# Patient Record
Sex: MALE | Race: WHITE
[De-identification: names, ages, dates, MRNs, and addresses within clinical notes are randomized per-mention and may not be internally consistent; named-entity substitution may affect disease eponyms.]

---

## 2019-08-19 NOTE — PDOC.FPRHP
- History of Present Illness


Chief Complaint: abdominal pain


History of Present Illness: 





Patient is 28M with PMHx of DM1, hypertrigliceridemia, and multiple admissions 

for pancreatitis presenting as a transfer from the Med for intractable pain/

splenomegaly. 





Patient reports that the pain began on  as a sharp stabbing LUQ pain 

accompanied by the feeling of abdominal fullness. He reports that he has also 

been having nausea, but denies vomiting. He states he has also been having 

diarrhea for the past 3-4 days accompanied with blood on the toilet paper when 

he wipes. He also reports of SOB and some vision changes today s/p opiate 

administration at the [facility]. 


Patient denies recent travel. Denies being sick recently. 


He was seen last week for left arm cellulitis and prescribed a 7-day course of 

clindamycin, given a prednisone shot, and prescribed a 5 day course of PO 

prednisone, which he has not taken since he has been hospitalized. 





- Allergies/Adverse Reactions


 Allergies











Allergy/AdvReac Type Severity Reaction Status Date / Time


 


No Known Allergies Allergy   Unverified 19 17:51














- Home Medications


 











 Medication  Instructions  Recorded  Confirmed  Type


 


Insulin Aspart [Novolog] 60 unit SQ AC 19 History














- History


PMHx:DMI, Hypertrigliceridemia, multiple hospitalizations for pancreatitis


 


PSHx: PTCA 2019, no CAD





FHx: 


mom: SLE, Brain cancer  at age 44


Dad: healthy


 


Social: no etoh for last 6 years, no smoking, no drugs


 








- Review of Systems


General: denies: fever/chills, weight/appetite/sleep changes


Eyes: reports: vision changes.  denies: eye pain


ENT: denies: nasal congestion, rhinorrhea


Respiratory: reports: shortness of breath.  denies: cough


Cardiovascular: denies: chest pain, edema


Gastrointestinal: reports: nausea, diarrhea, abdominal pain.  denies: vomiting


Genitourinary: denies: incontinence, dysuria


Skin: reports: rashes (LUE cellulitis)


Musculoskeletal: denies: pain, tenderness


Neurological: denies: numbness, weakness


Psychological: denies: anxiety, depression





- Vital signs


BP: [146/82]  HR: [85] RR: [20] Tmax: [99F] Pox: [91]% on [RA]  Wt: [131.5kg]   








- Physical Exam


Constitutional: awake, alert and oriented, well developed


HEENT: normocephalic and atraumatic, EOMI


Neck: supple, trachea midline


Chest: no-tender to palpation, no lesions


Heart: RRR, normal S1/S2


Lungs: no respiratory distress, good air movement


Abdomen: soft, bowel sounds present, other (RUQ tenderness to palpation)


Musculoskeletal: normal structure, normal tone


Neurological: no focal deficit, normal sensation


Skin: no rash/lesions (erythematous rash on Left forearm), good turgor, 

capillary refill <2 seconds


Heme/Lymphatic: no unusual bruising or bleeding, no purpura


Psychiatric: normal mood and affect, good judgment and insight





FMR H&P: Results





- Labs


Result Diagrams: 


 19 07:46





 19 04:14





- Radiology Interpretation


  ** CT scan - abdomen


Status: report reviewed by me (spleen [id number], hepatic steatosis, mild 

hepatomegaly)





  ** US - abdomen


Status: report reviewed by me (splenomegaly, fatty liver)





FMR H&P: A/P





- Problem List


(1) Splenomegaly


Current Visit: Yes   Status: Acute   Code(s): R16.1 - SPLENOMEGALY, NOT 

ELSEWHERE CLASSIFIED   





(2) Hypertriglyceridemia


Current Visit: Yes   Status: Acute   Code(s): E78.1 - PURE HYPERGLYCERIDEMIA   





(3) Diabetes type 1, uncontrolled


Current Visit: Yes   Status: Acute   Code(s): E10.65 - TYPE 1 DIABETES MELLITUS 

WITH HYPERGLYCEMIA   





- Plan





28M presented as transfer from the [facility] for splenomegaly and intractable 

abdominal pain





#Splenomegaly/intractable abdominal pain


-patient has had diarrhea the last 3-4 days associated with timeline of pain 

onset


-campy stool studies


-hep a, b, c studies


-hiv 


-cdiff


-fobt


-tylenol/tramadol for pain management


-consult heme/onc in the am, appreciate recs








#Hypertrigliceridemia


-was reportedly d/c from last hospitalization on fenofibrate, but patient has 

no recollection of this


-has multiple past hospitalizations for pancreatitis


-PTCA 2019 shows no CAD


-begin fenofibrate this hospitalization and encourage continuation outpatient


-begin statin this hospitalization





#DMII


-patient reportedly on 60u novolog pre-prandial


-recent A1C 9.5


-putting patient on 70/30 regimen


-aggressive sliding scale





#LUE cellulitis


-patient was on clindamycin and prednisone, but hasn't been on them since 

hospitalized


-starte patient on doxycycline





DVT proph: lovenox


GI proph: pepcid


Diet: CC





Dispo: inpatient for splenomegaly workup and pain management


Code: Full





FMR H&P: Upper Level





- Plan


Date/Time: 19








IDarrian MD, have evaluated this patient and agree with findings/plan 

as outlined by intern resident. Pertinent changes/additions are listed here.





Devaughn Flores is a 28 year old M with a PMH of type I DM per pt, 

hypertriglyceridemia, and multiple admission in past for pancreatitis who was 

transferred from the [facility] to [facility] for intractible abdominal pain and 

splenomegaly.  Patient states that he has had nausea, vomiting, diarrhea and 

left sided abdominal pain for about 2 days but it became constant and 10/10 

morning of 19.  Describes pain as sharp with no radiation. Denies fever, 

chills, chest pain, dypsnea, GI bleeding.  He went to [facility] where he was 

admitted.  Labs there were significant for lipase of 93, triglyceride of 1161, 

hemoglobin a1c of 9.5, neg lactic acid, normal AST/ALT.  CT abd at [facility] showed 

splenomegaly with no evidence of infarction and mild hepatic steatosis. At 

outside hospital, he was given multiple rounds of dilaudid for his abdominal 

pain.  Patient states it improved his pain for the most part.  Patient denies 

any sore throat, swollen lymph nodes, fevers, recent illnesses.  Patient is 

being placed on obs/medical for intractible abdominal pain and splenomegaly.  

Will consult heme/onc in the AM.  Continue pain control.  Checking stool studies

, HIV, Hepatitis panel, PT/INR, FOBT.  Anticipate hospital stay < 48 hours.  

Please see intern note above for full H&P, which I have reviewed and agree 

with.  





Addendum - Attending





- Attending Attestation


Date/Time: 19 1005





I personally evaluated the patient and discussed the management with Dr. Moreira 

and Emanuel on .


I agree with the History, Examination, Assessment and Plan documented above 

with any addition or exceptions noted below.





No opiate rx [place].  Await oncology consultation.  Hep/HIV screens.  

Consider workup as an outpatient if pain improved in AM.

## 2019-08-20 NOTE — PDOC.FM
- Subjective


Subjective: 





Pt states he has pain not controlled with tramadol. He has no other complaints.





- Objective


Vital Signs & Weight: 


 Vital Signs (12 hours)











  Temp Pulse Resp BP Pulse Ox


 


 08/20/19 08:00  98.1 F  67  20  140/83  95


 


 08/20/19 04:15  98.2 F  74  18  154/73 H  91 L


 


 08/20/19 00:05  98.2 F  96  18  139/80  93 L








 Weight











Weight                         131.542 kg














Result Diagrams: 


 08/20/19 07:46





 08/20/19 04:14





Phys Exam





- Physical Examination


Constitutional: NAD


HEENT: PERRLA, sclera anicteric


Neck: no nodes, no JVD


Respiratory: no wheezing, no rhonchi, clear to auscultation bilateral


Cardiovascular: RRR, no significant murmur


Gastrointestinal: soft, no distention, positive bowel sounds


tender to palpation in LUQ on medium palpation, no signs of peritonitis


Neurological: non-focal, moves all 4 limbs


Psychiatric: normal affect, A&O x 3





Dx/Plan


(1) LUQ abdominal pain


Code(s): R10.12 - LEFT UPPER QUADRANT PAIN   Status: Acute   





(2) Diabetes type 1, uncontrolled


Code(s): E10.65 - TYPE 1 DIABETES MELLITUS WITH HYPERGLYCEMIA   Status: Acute   





(3) Hypertriglyceridemia


Code(s): E78.1 - PURE HYPERGLYCERIDEMIA   Status: Acute   





(4) Splenomegaly


Code(s): R16.1 - SPLENOMEGALY, NOT ELSEWHERE CLASSIFIED   Status: Acute   





- Plan


Plan: 


28M presented as transfer from the [facility] for splenomegaly and intractable 

abdominal pain





#Splenomegaly


# LUQ Pain


Pain he states in not well controlled with tramadol, did not receive ibuprofen.

  Pain has been present for 3 days.  Pain is not intractable, he was able to 

perform daily activities such as shower today.  CT Abdomen revealed 

splenomegaly.  RUQ u/s revealed mild hepatic steatosis. Patient has had 

diarrhea the last 3-4 days associated with timeline of pain onset. Hep C, HIV 

negative.  Will continue to look for infectious etiology, consult surg, consult 

heme/onc.  Pt has leukopenia, thrombocytopenia.


-campy stool studies


-hiv 


-cdiff


-tylenol/tramadol for pain management


-consult heme/onc, appreciate recs


- consult surg, appreciate recs





#Hypertrigliceridemia


-was reportedly d/c from last hospitalization on fenofibrate, but patient has 

no recollection of this


-has multiple past hospitalizations for pancreatitis


-PTCA july 2019 shows no CAD


-begin statin this hospitalization, hold off on fenifibrate at this time 

potentially start outpt





#DMII 


-patient reportedly on 60u novolog pre-prandial


-recent A1C 9.5


-putting patient on 70/30 regimen


-aggressive sliding scale





#LUE cellulitis


-patient was on clindamycin and prednisone, but hasn't been on them since 

hospitalized


-started patient on doxycycline, will re-assess need for abx





DVT proph: lovenox


GI proph: pepcid


Diet: CC





Dispo: inpatient for splenomegaly workup and pain management


Code: Full








Addendum - Attending





- Attending Attestation


Date/Time: 08/20/19 7826





I personally evaluated the patient and discussed the management with Dr. Olvera.  


I agree with the History, Examination, Assessment and Plan documented above 

with any addition or exceptions noted below.


He has splenic tendernes on exam, but no rebound.  Pain is not intractable.  He 

is able to ambulate, toilet, shower and take po without difficulty.

## 2019-08-21 NOTE — PDOC.FM
- Subjective


Subjective: 





Pt is doing well. Pain is well controlled.  He did not need tylenol through 

evening.





- Objective


Vital Signs & Weight: 


 Vital Signs (12 hours)











  Temp Pulse Resp BP Pulse Ox


 


 08/20/19 19:45  98.7 F  68  12  156/97 H  97








 Weight











Admit Weight                   131.542 kg


 


Weight                         131.542 kg














I&O: 


 











 08/19/19 08/20/19 08/21/19





 06:59 06:59 06:59


 


Intake Total   1150


 


Balance   1150











Result Diagrams: 


 08/21/19 04:00





 08/21/19 04:00





Phys Exam





- Physical Examination


Constitutional: NAD


Respiratory: no wheezing, clear to auscultation bilateral


Cardiovascular: RRR, no significant murmur


Gastrointestinal: soft, no distention, positive bowel sounds


mild tenderness in LUQ, pressure felt in all other quadrants 





Dx/Plan


(1) LUQ abdominal pain


Code(s): R10.12 - LEFT UPPER QUADRANT PAIN   Status: Acute   





(2) Diabetes type 1, uncontrolled


Code(s): E10.65 - TYPE 1 DIABETES MELLITUS WITH HYPERGLYCEMIA   Status: Acute   





(3) Hypertriglyceridemia


Code(s): E78.1 - PURE HYPERGLYCERIDEMIA   Status: Acute   





(4) Splenomegaly


Code(s): R16.1 - SPLENOMEGALY, NOT ELSEWHERE CLASSIFIED   Status: Acute   





- Plan


Plan: 


28M presented as transfer from the [facility] for splenomegaly, discharge today





#Splenomegaly


# LUQ Pain


LUQ pain has been present for 3 days.  Pain is not intractable, he was able to 

perform daily activities such as shower today.  CT Abdomen revealed 

splenomegaly.  RUQ u/s revealed mild hepatic steatosis. Patient has had 

diarrhea the last 3-4 days associated with timeline of pain onset. Hep C, HIV 

negative.  Will continue to look for infectious etiology, consult surg, consult 

heme/onc.  Pt has leukopenia, thrombocytopenia.


-campy stool studies


-hiv 


-cdiff


-tylenol/tramadol for pain management, one dose of toradol through evening - 

will controlled


-consult heme/onc, appreciate recs; recommend flow cytometry-pending


- consult surg, appreciate recs





#Hypertrigliceridemia


-was reportedly d/c from last hospitalization on fenofibrate, but patient has 

no recollection of this


-has multiple past hospitalizations for pancreatitis


-PTCA july 2019 shows no CAD


-begin statin this hospitalization, hold off on fenifibrate at this time 

potentially start outpt





#DMII 


-patient reportedly on 60u novolog pre-prandial


-recent A1C 9.5


-putting patient on 70/30 regimen - increased to 35 U BID


- C peptide pending


-aggressive sliding scale





#LUE cellulitis


-patient was on clindamycin and prednisone, but hasn't been on them since 

hospitalized


-started patient on doxycycline, will re-assess need for abx





# Elevated Blood Pressure


- consider lisinopril, renal protective as well





DVT proph: lovenox


GI proph: pepcid


Diet: CC





Dispo: inpatient for splenomegaly workup and pain management


Code: Full











Addendum - Attending





- Attending Attestation


Date/Time: 08/21/19 8946





I personally evaluated the patient and discussed the management with Dr. Olvera.


I agree with the History, Examination, Assessment and Plan documented above 

with any addition or exceptions noted below.


Patient is stable for discharge.  he will follow up with our clinic, and Heme 

Onc.

## 2019-08-21 NOTE — CON
DATE OF CONSULTATION:  



REASON FOR CONSULT:  Splenomegaly.



HISTORY OF PRESENT ILLNESS:  The patient is a 28-year-old  male, who

presented to the emergency room two days ago with abdominal discomfort.  He

complained of right upper quadrant pain, nausea, vomiting, and diarrhea.  CT scan

showed an enlarged spleen measuring 19.5 x 18.5 x 12 cm.  He has a fatty liver.

There was no portal vein thrombus.  The patient had a hospitalization for

pancreatitis in July.  A CT scan of his abdomen done at that time showed a spleen

measuring 18.3 cm.  The patient has a history of pancreatitis with four admissions

in the past two years.  He recently moved here from [place] in January.  He had two

episodes of hospitalization for pancreatitis last year and two in this year with his

last one being in July.  He states he has never been evaluated by a

gastroenterologist and the cause of his pancreatitis is unknown.  He denies any

alcohol or drug use.  Labs performed in the emergency room on the  showed a

white count of 4.6, hemoglobin of 15.3, and platelet count of 149,000, he was

microcytic with an MCV value of 75.  We were asked to see the patient regarding his

splenomegaly.  He again denies any fever, chills, or night sweats.  No fatigue.  He

has lost 20 pounds over the last six months, but with intentional weight loss.  No

rash.  No bleeding.  No bruising. 



PAST MEDICAL HISTORY:  

1. Chronic pancreatitis.

2. Diabetes mellitus 2.

3. Hypertriglyceridemia.

4. Left arm cellulitis.



PAST SURGICAL HISTORY:  PTCA in 2019.



ALLERGIES:  NO KNOWN DRUG ALLERGIES.



HOME MEDICATIONS:  Insulin daily.



FAMILY HISTORY:  Mother  at early age from brain cancer.  Father is healthy.



REVIEW OF SYSTEMS:  A 10-point review of systems is negative except per HPI.



PHYSICAL EXAMINATION:

VITAL SIGNS:  Temperature 97.9, pulse is 52, respiratory rate 20, and blood pressure

is 124/64.  He is 98% on room air. 

GENERAL:  Well-developed, well-nourished male, in no acute distress. 

HEENT:  Normocephalic and atraumatic.  Pupils are equal and reactive to light. 

NECK:  Supple. 

CV:  Regular rate and rhythm. 

LUNGS:  Clear anterior. 

ABDOMEN:  Soft and nontender.  Bowel sounds are positive.  Unable to palpate spleen

secondary to body habitus. 

EXTREMITIES:  No clubbing, cyanosis, or edema. 

SKIN:  No rash.  Multiple tattoos. 

HEMATOLOGIC:  No petechiae or purpura. 

NEUROLOGIC:  Nonfocal. 

PSYCH:  He is alert, oriented, and appropriate.



PERTINENT LABS AND X-RAYS:  Current WBCs are 4.1, hemoglobin 14.3, hematocrit 37.7,

and platelet count is 112,000.  He has 58% neutrophils, 33% lymphocytes, and ANC is

2.4.  Sodium is 135, potassium is 3.9, chloride is 102, CO2 is 21, BUN is 12,

creatinine is 0.86, and calcium is 8.7.  Iron is 45, TIBC is 265, and ferritin is

31.56.  Bilirubin is 0.4, AST is 12, ALT is 21, alkaline phosphatase is 76, serum

total protein is 6.5, albumin is 3.7, and globulin is 2.8.  Hepatitis and HIV are

negative.  Mono screen is negative. 



ASSESSMENT:  

1. Splenomegaly.

2. Leukopenia and thrombocytopenia, likely secondary to splenomegaly.

3. Poorly controlled diabetes with a hemoglobin A1c of 9.5.

4. Hyperlipidemia, noncompliant with medications.

5. Abdominal pain.



DISCUSSION:  The patient states that he has been told in the past that his spleen

was enlarged.  He has never seen a gastroenterologist for his pancreatitis or

splenomegaly.  We would recommend outpatient workup.  He has no symptoms of

lymphoma, but do agree with a flow cytometry, which has been sent already.

Hemoglobin electrophoresis has also been sent given his low MCV.  His intermittent

low counts are likely due from splenomegaly, but again he needs further workup in

the outpatient setting to determine the cause, which could certainly include

pulmonary hypertension and fatty liver.  He will be discharged home today as his

grandmother is doing poorly and he wishes to go see her.  He will follow up in the

outpatient setting with the residents, who will refer him to GI and to us should any

of the test come back abnormal. 



Thank you for the consult.







Job ID:  [id number]

## 2019-08-22 NOTE — DIS
DATE OF ADMISSION:  08/19/2019



DATE OF DISCHARGE:  08/21/2019



CONSULTS:  Oncology.



PROCEDURES:  None.



PRIMARY DIAGNOSES:  

1. Splenomegaly.

2. Diabetes.

3. Hypertriglyceridemia.



SECONDARY DIAGNOSES:  None.



DISCHARGE MEDICATIONS:  

1. Humulin 70/30, 35 units subcutaneous b.i.d.

2. Atorvastatin 20 mg p.o. at bedtime.



DISCONTINUED MEDICATIONS:  Insulin Aspart 60 units subcutaneous.



HISTORY OF PRESENT ILLNESS/HOSPITAL COURSE:  The patient presented to [facility] as a transfer from [facility].  Before transfer, he was

noted to have splenomegaly.  CT scan revealed a spleen 19.5 cm x 12 cm x 18.5 cm,

hepatic steatosis and mild hepatomegaly.  The abdominal ultrasound revealed

splenomegaly and fatty liver. When he got to [facility], he was noted

to have intractable pain, so he was given Dilaudid at that time.  At the time he got

to [facility], his pain was not intractable and was controlled with tramadol

and Tylenol.  He did require ketorolac at one point in time. 



We worked him up for causes of splenomegaly, was found to have one being a

malignancy.  So, we consulted Oncology, who stated his white blood cell count of

4.1, hemoglobin 14.3, platelet count 112, was not superb remarkable, but advised

ordering a flow cytometry of the blood.  At this time, this is pending.  His

hepatitis panel was nonreactive, mono screen was negative, HIV was nonreactive.

Iron was found to be low at 45, TIBC was 265, and ferritin was low at 31.56.  He

also has a plasmapheresis pending. 



His glucose was noted to be at 311, and he was taking NovoLog 60 units subcutaneous

meals, so we discontinued this and started Humulin 70/30, 35 units subcutaneous

b.i.d., which he tolerated well.  This will need to be titrated up.  He was also

found to have an A1c of 9.5. 



He had hypertriglyceridemia, for which he has had multiple hospitalizations for

pancreatitis.  On his last hospitalization, he was supposed to start fenofibrate,

but not.  We will consider restarting this in the outpatient setting, and did start

him on statin therapy. 



At this time, his workup has shown to be fairly unremarkable, but we are awaiting on

a flow cytometry to further stratify the splenomegaly.  His pain was under control

by the time he left.  If flow cytometry comes back with abnormalities, we will again

contact Oncology.  If not, patient is set to follow up with the clinic in 1 week.

At this time, his diabetic medications, hypertriglyceridemia should be addressed. 



DISPOSITION:  Stable.



DISCHARGE INSTRUCTIONS:  

1. Location:  [facility].

2. Diet: Low carb diet.

3. Activity:  Ad skinny.

4. Followup:  [facility].

5. with Dr. Olvera in 1 week.







Job ID:  [id number]

## 2020-01-26 NOTE — HP
CHIEF COMPLAINT:  Perineal abscess.



HISTORY OF PRESENT ILLNESS:  This patient is a 29-year-old male with a history 
of

chronic splenomegaly, which was thoroughly evaluated previously, has history of

diabetes mellitus, history of pancreatitis and some prior skin abscesses 
including

the perineum in his back.  He reports that he was in his usual state of health 
until

yesterday when he went to work, he felt slight discomfort in the perineal area.
  He

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

shift working as a bouncer, he was in so much pain, he could barely walk, and he

presented to the emergency department.  He denies any fevers or chills or any

drainage from this area. 



REVIEW OF SYSTEMS:  All systems reviewed, all pertinent positives and negatives

noted in the history of present illness. 



PAST MEDICAL HISTORY:  Notable for the splenomegaly with some thrombocytopenia 
on

his last visit.  This was evaluated in August with some flow cytometry, which 
was

negative for any evidence of myeloproliferative disorder or malignancy.  He has 
a

history of diabetes mellitus.  He has been told that he is a type 1 diabetic and

went for 20 plus years without knowing he was a diabetic.  He has been on 
metformin,

was taken off it, and is now on high dose insulin.  He had a C-peptide checked 
in

August, which was elevated consistent with type 2 diabetes.  He has a history of

recurrent pancreatitis and presumably it was the source of which was never fully

elucidated. 



PAST SURGICAL HISTORY:  Pilonidal cyst.



FAMILY HISTORY:  Father had hypertension.  Mother has lupus and diabetes.



SOCIAL HISTORY:  The patient does not smoke, drink, or do drugs.  He is single.
  He

says he quit drinking about 6 years ago.  Never drank heavily.  He is full code 
and

his father, Demond Ramírez, would be his surrogate decision maker should that 
become

necessary. 



ALLERGIES:  NKDA



MEDICATIONS:

   70/30 60 units SQ q am

      30 unit QAC



PHYSICAL EXAMINATION:

VITAL SIGNS:  Most recent vitals, blood pressure 149/79, pulse 99, O2 
saturations

98%, respirations 13. 

GENERAL APPEARANCE:  Age-appropriate male, overweight, in no distress, but 
clearly

uncomfortable. 

HEENT:  PERRL.  No OP lesions. 

NECK:  Supple and symmetric. 

HEART:  Regular rate and rhythm without murmurs, gallops, or rubs. 

LUNGS:  Clear to auscultation bilaterally with good chest wall expansion and air

exchange. 

ABDOMEN:  Soft, nontender, and nondistended.  Positive bowel sounds.  No 
masses.  No

organomegaly. 

EXTREMITIES:  No cyanosis, clubbing, or edema. 

PSYCHIATRIC:  Normal affect and behavior. 

NEUROLOGIC:  Cranial nerves are intact.  He has no peripheral focal deficits.

Cognitively intact. 

:  Reveals approximately 3 x 6 cm raised tender abscess area posterior to the 
left

hemiscrotum in the perineum.  There is no significant halo of cellulitic 
changes.

There does appear to be some evidence of prior scarring from prior abscesses in 
this

area. 



LABORATORY DATA:  White count 6.2, hemoglobin 14.7, platelets 136.  Sodium 136,

potassium 4.0, chloride 102, CO2 is 18, BUN 12, creatinine 1.22, glucose 457.

Lactic acid 2.8, subsequent 1.8.  AST is 14, ALT is 27. 



IMPRESSION AND PLAN:  

1. Left perineal abscess.  The patient has a history of prior skin abscesses.  
He

has no prior culture results within our computer system.  We will cover with

vancomycin and Zosyn.  I am concerned that the patient may be at risk for

methicillin-resistant Staphylococcus aureus given recurrent nature of these.  We

will consult Urology to determine if incision and drainage is indicated.  
Certainly

may be in the interim, we will continue to cover with pain medications and keep 
him

n.p.o. for now and avoid anticoagulation. 

2. Diabetes mellitus, poorly controlled.  The patient reports he is taking high 
dose

insulin.  He was taken off metformin because he was told it was not working.  He

does not currently have a primary care provider.  There is clear evidence that 
he is

type 2 based on elevated C-peptide level and the large amounts of insulin he is

requiring.  We will keep him on sliding scale for now as he is n.p.o.  We will

hydrate fairly aggressively.  It appears that he received 1 L of fluid in the

emergency 

department along with 10 units of insulin.  He does not appear to have any 
evidence

of ketoacidosis or hyperosmolar state. 

3. History of splenomegaly, previously evaluated.

4. History of pancreatitis.  No evidence of currently active disease.







Job ID:  [id number]



[facility]

## 2020-01-26 NOTE — CON
DATE OF CONSULTATION:  01/26/2020



REASON FOR CONSULT:  Scrotal abscess.



CHIEF COMPLAINT:  Perineal pain.



HISTORY OF PRESENT ILLNESS:  This is a 29-year-old male with acute onset of scrotal

discomfort and swelling, beginning yesterday afternoon and acutely worsening

overnight.  He was seen in the emergency room, where he was evaluated and found to

have a scrotal/perineal abscess.  He does have a history of type 1 diabetes, managed

with insulin and so he was admitted to the Hospitalist Service and Urology

consultation was requested to drain the abscess.  Glucose has been elevated.  The

patient reports no fevers, dysuria, hematuria, headaches, chest pains, difficulty

breathing. 



PAST MEDICAL HISTORY:  Diabetes as mentioned above.



PAST SURGICAL HISTORY:  Pilonidal cyst.



FAMILY HISTORY:  Reviewed, noncontributory.



SOCIAL HISTORY:  No current substance abuse.  Works as a bouncer.



REVIEW OF SYSTEMS:  A 10-point review of systems performed, negative except as

mentioned in my HPI. 



MEDICATIONS:  Reviewed.  No pertinent urology medications.



PHYSICAL EXAMINATION:

GENERAL:  No acute distress, resting comfortably in bed, conversant. 

RESPIRATORY:  Breathing unlabored.  Symmetric chest expansion. 

HEENT:  Head, normocephalic and atraumatic.  Eyes, normal eye movements.  Sclerae

nonicteric. 

HEART:  Regular rate and rhythm. 

ABDOMEN:  Soft, nontender, and nondistended. 

:  No flank tenderness.  No suprapubic tenderness.  Normal penile exam and scrotal

exam.  He does have a small abscess no more than 2 cm in size on the left side of

his perineum at the base of the scrotum.  There is a small area of fluctuance with

minimal erythema over top.  There is no crepitus. 

SKIN:  Warm and dry. 

EXTREMITIES:  Without clubbing, cyanosis, or edema. 

NEUROLOGIC:  Alert and oriented x3. 

PSYCHIATRIC:  Normal mood and affect.



LABORATORY DATA:  White count 6.2, glucose 457 on admission.



IMPRESSION AND PLAN:  Left perineal abscess. 



The patient will require drainage at the bedside.  I have ordered fentanyl and

lidocaine for this purpose.  The patient and I discussed his options and we both

agreed that drainage is the correct course from here.  He likely will need

antibiotics for several days afterwards, but antibiotics will not fix his abscess,

which he understands.  I explained the procedure in detail including the risks of

bleeding, infection, pain, need for repeat drainage or debridement.  He expressed

understanding and wishes to proceed. 



DESCRIPTION OF PROCEDURE:  Under sterile conditions, I anesthetized the surrounding

tissues of the abscess.  An 11 blade was used to make a 1.5 cm incision over the

area of greatest fluctuance.  Purulent drainage was noted.  This was irrigated with

half-strength peroxide.  I then packed the wound with iodoform gauze and covered

with 4x4s.  The patient tolerated the procedure very well.  There were no

complications. 







Job ID:  [id number]

## 2020-01-27 NOTE — PRG
DATE OF SERVICE:  01/27/2020



CHIEF COMPLAINT:  Perineal abscess.



SUBJECTIVE:  No acute events overnight. 



The patient denies fevers, nausea, dysuria, or chest pains. 



He does report that the wound has been painful overnight.



REVIEW OF SYSTEMS:  A 10-point review of systems otherwise negative.



OBJECTIVE:  VITAL SIGNS:  Afebrile, mild tachycardia overnight. 

GENERAL:  No acute distress, conversant. 

LUNGS:  Unlabored breathing.  Symmetric chest expansion. 

HEART:  Regular rate and rhythm. 

ABDOMEN:  Soft, nontender, and nondistended. 

:  Scrotum unremarkable, base of the scrotum/perineum without erythema, mild

induration.  The packing was removed.  Wound appears healthy clean gauze 
placed. 

SKIN:  Warm and dry. 

NEUROLOGIC:  Alert and oriented x3.



LABORATORY DATA:  White count 6.4.  Creatinine 1.06.  Blood sugar has been 
typically

over 300. 



ASSESSMENT AND PLAN:  Scrotal/perineal abscess, postop day 1, drainage at 
bedside. 



Packing removed.  We discussed wound care including daily baths and showers,

cleaning with soapy water, covering with dry gauze. 



He will need antibiotics for the next week and will follow up in my office 
around

two weeks. 





Job ID:  [id number]



[facility]

## 2020-01-27 NOTE — PDOC.HOSPP
- Subjective


Subjective: 





Doing ok.  Still has pain.  





- Objective


Vital Signs & Weight: 


 Vital Signs (12 hours)











  Temp Pulse Resp BP BP Pulse Ox


 


 01/27/20 19:46  98.6 F  98  18   147/84 H  96


 


 01/27/20 18:22      167/100 H 


 


 01/27/20 17:30  99.1 F  93  18   176/103 H  96


 


 01/27/20 13:46   101 H   154/97 H  


 


 01/27/20 11:00  98.8 F  101 H  18   154/98 H  96








 Weight











Weight                         285 lb 14.4 oz














I&O: 


 











 01/26/20 01/27/20 01/28/20





 06:59 06:59 06:59


 


Intake Total  4430 2600


 


Output Total  3850 2000


 


Balance  580 600











Result Diagrams: 


 01/27/20 05:23





 01/27/20 05:23


Additional Labs: 


 Accuchecks











  01/27/20 01/27/20 01/27/20





  19:28 16:15 11:20


 


POC Glucose  272 H  333 H  304 H














  01/27/20





  05:30


 


POC Glucose  331 H














Hospitalist ROS





- Medication


Medications: 


Active Medications











Generic Name Dose Route Start Last Admin





  Trade Name Freq  PRN Reason Stop Dose Admin


 


Acetaminophen  1,000 mg  01/26/20 11:03  01/26/20 21:00





  Tylenol  PO   1,000 mg





  Q6H PRN   Administration





  Moderate to Severe Pain (6-10)   





     





     





     


 


Piperacillin Sod/Tazobactam  100 mls @ 200 mls/hr  01/26/20 12:00  01/27/20 17:

52





  Sod 3.375 gm/ Sodium Chloride  IVPB   100 mls





  Q6HR SEBASTIAN   Administration





     





     





     





     


 


Sodium Chloride  1,000 mls @ 125 mls/hr  01/26/20 08:17  01/27/20 17:52





  Normal Saline 0.9%  IV   1,000 mls





  .Q8H SEBASTIAN   Administration





     





     





     





     


 


Vancomycin HCl 1.5 gm/ Device  300 mls @ 200 mls/hr  01/27/20 13:00  01/27/20 21

:45





  IVPB   300 mls





  0500,1300,2100 SEBASTIAN   Administration





     





     





     





     


 


Insulin Human Lispro  0 units  01/26/20 08:16  01/27/20 17:55





  Humalog  SC   11 units





  .AGGRESSIVE SLIDING  PRN   Administration





  Aggressive Correctional Scale   





     





     





     


 


Morphine Sulfate  6 mg  01/26/20 11:02  01/27/20 21:43





  Morphine  SLOW IVP   6 mg





  Q4H PRN   Administration





  Moderate to Severe Pain (6-10)   





     





     





     


 


Sodium Chloride  10 ml  01/26/20 09:00  01/27/20 21:45





  Flush - Normal Saline  IVF   10 ml





  Q12HR SEBASTIAN   Administration





     





     





     





     














- Exam


General Appearance: NAD, awake alert


Heart: RRR, no murmur, no gallops, no rubs, normal peripheral pulses


Respiratory: CTAB, no wheezes, no rales, no ronchi, normal chest expansion, no 

tachypnea, normal percussion


Gastrointestinal: soft, non-tender, non-distended, normal bowel sounds, no 

palpable masses, no hepatomegaly, no splenomegaly, no bruit


Extremities: no cyanosis, no clubbing, no edema


Skin: normal turgor, no lesions, no rashes


Skin - other findings: Left perineal abscess improved.  


Musculoskeletal: normal tone, normal strength, no muscle wasting





Hosp A/P


(1) Perineal abscess


Code(s): L02.215 - CUTANEOUS ABSCESS OF PERINEUM   Status: Acute   





(2) Diabetes type 1, uncontrolled


Code(s): E10.65 - TYPE 1 DIABETES MELLITUS WITH HYPERGLYCEMIA   Status: Acute   





(3) Hypertriglyceridemia


Code(s): E78.1 - PURE HYPERGLYCERIDEMIA   Status: Acute   





(4) Splenomegaly


Code(s): R16.1 - SPLENOMEGALY, NOT ELSEWHERE CLASSIFIED   Status: Acute   





- Plan





Abscess was drained yesterday.


Doing ok.  Looks good.


Blood sugars are terrible.


Start some long acting insulin today.


BP high.  Adding acei.

## 2020-01-28 NOTE — CT
CT OF PELVIS PERFORMED WITH CONTRAST ENHANCEMENT:

1/28/20

 

HISTORY: 

Evaluation for a perineal abscess. 

 

There is no free fluid noted within the pelvis. No pelvic lymphadenopathy or mass. The bladder is nor
mal in position. The prostate does not appear significantly enlarged. 

 

There is some air seen within the soft tissues. This is posterior to the region of the base of the sc
rotum and mainly is related to some inflammatory change. There appears to be a tiny air fluid level i
n this region and measures approximately 1.7 cm in maximum size. 

 

IMPRESSION: 

Inflammatory change and air seen within the superficial subcutaneous fat along the medial aspect of t
he upper thigh directly posterior to the region of the base of the scrotum. No significant fluid chalino
ection seen. 

 

POS: [facility]

## 2020-01-28 NOTE — PDOC.HOSPP
- Subjective


Subjective: 





Doing ok, but has increased area of induration and pain in the area of the 

abscess.  





- Objective


Vital Signs & Weight: 


 Vital Signs (12 hours)











  Temp Pulse Resp BP BP Pulse Ox


 


 01/28/20 16:40  98.8 F  81  20   149/83 H  97


 


 01/28/20 12:13  98.0 F  79  18   161/98 H  99


 


 01/28/20 08:20   74   149/74 H  


 


 01/28/20 08:00  98.1 F  74  18   149/74 H  97


 


 01/28/20 07:40       97








 Weight











Weight                         285 lb 14.4 oz














I&O: 


 











 01/27/20 01/28/20 01/29/20





 06:59 06:59 06:59


 


Intake Total 4430 4850 2825


 


Output Total 3850 3550 


 


Balance 580 1300 2825











Result Diagrams: 


 01/27/20 05:23





 01/27/20 05:23


Additional Labs: 


 Accuchecks











  01/28/20 01/28/20 01/28/20





  16:28 10:57 04:41


 


POC Glucose  277 H  315 H  283 H














  01/27/20





  19:28


 


POC Glucose  272 H














Hospitalist ROS





- Medication


Medications: 


Active Medications











Generic Name Dose Route Start Last Admin





  Trade Name Freq  PRN Reason Stop Dose Admin


 


Acetaminophen  1,000 mg  01/26/20 11:03  01/26/20 21:00





  Tylenol  PO   1,000 mg





  Q6H PRN   Administration





  Moderate to Severe Pain (6-10)   





     





     





     


 


Enoxaparin Sodium  40 mg  01/28/20 09:00  01/28/20 08:20





  Lovenox  SC   40 mg





  0900 SEBASTIAN   Administration





     





     





     





     


 


Famotidine  20 mg  01/28/20 09:00  01/28/20 08:19





  Pepcid  PO   20 mg





  BID SEBASTIAN   Administration





     





     





     





     


 


Piperacillin Sod/Tazobactam  100 mls @ 200 mls/hr  01/26/20 12:00  01/28/20 17:

53





  Sod 3.375 gm/ Sodium Chloride  IVPB   100 mls





  Q6HR SEBASTIAN   Administration





     





     





     





     


 


Sodium Chloride  1,000 mls @ 125 mls/hr  01/26/20 08:17  01/28/20 15:53





  Normal Saline 0.9%  IV   Not Given





  .Q8H SEBASTIAN   





     





     





     





     


 


Vancomycin HCl 1.5 gm/ Device  300 mls @ 200 mls/hr  01/27/20 13:00  01/28/20 13

:51





  IVPB   300 mls





  0500,1300,2100 SEBASTIAN   Administration





     





     





     





     


 


Insulin Human Lispro  0 units  01/26/20 08:16  01/28/20 11:32





  Humalog  SC   11 units





  .AGGRESSIVE SLIDING  PRN   Administration





  Aggressive Correctional Scale   





     





     





     


 


Insulin Human Regular  15 units  01/28/20 17:00  01/28/20 17:42





  Humulin R  SC   15 unit





  AC SEBASTIAN   Administration





     





     





     





     


 


Ketorolac Tromethamine  30 mg  01/26/20 11:01  01/28/20 04:03





  Toradol  IVP  01/31/20 11:02  30 mg





  Q6H PRN   Administration





  Pain   





     





     





     


 


Lisinopril  5 mg  01/28/20 09:00  01/28/20 08:20





  Zestril  PO   5 mg





  DAILY SEBASTIAN   Administration





     





     





     





     


 


Morphine Sulfate  6 mg  01/26/20 11:02  01/28/20 15:50





  Morphine  SLOW IVP   6 mg





  Q4H PRN   Administration





  Moderate to Severe Pain (6-10)   





     





     





     


 


Senna/Docusate Sodium  2 tab  01/28/20 09:00  01/28/20 08:19





  Senokot S  PO   Not Given





  BID SEBASTIAN   





     





     





     





     


 


Sodium Chloride  10 ml  01/26/20 09:00  01/28/20 08:21





  Flush - Normal Saline  IVF   10 ml





  Q12HR SEBASTIAN   Administration





     





     





     





     














- Exam


General Appearance: NAD, awake alert


Heart: RRR, no murmur, no gallops, no rubs, normal peripheral pulses


Respiratory: CTAB, no wheezes, no rales, no ronchi, normal chest expansion, no 

tachypnea, normal percussion


Gastrointestinal: soft, non-tender, non-distended, normal bowel sounds, no 

palpable masses, no hepatomegaly, no splenomegaly, no bruit


Skin - other findings: Induration of left perineal area at I&D site 4 cm.  No 

cellulitic changes. 





Hosp A/P


(1) Perineal abscess


Code(s): L02.215 - CUTANEOUS ABSCESS OF PERINEUM   Status: Acute   





(2) Diabetes type 1, uncontrolled


Code(s): E10.65 - TYPE 1 DIABETES MELLITUS WITH HYPERGLYCEMIA   Status: Acute   





(3) Hypertriglyceridemia


Code(s): E78.1 - PURE HYPERGLYCERIDEMIA   Status: Acute   





(4) Splenomegaly


Code(s): R16.1 - SPLENOMEGALY, NOT ELSEWHERE CLASSIFIED   Status: Acute   





- Plan





Abscess was drained 1/26.


Doing ok.  Looks good.


Blood sugars are terrible.


Start some long acting insulin yesterday.


He is ordering additional food.


Adding some short acting insulin with meals. 


He is on an unusual regimen at home.  


BP high.  Added ACEI.

## 2020-01-29 NOTE — PRG
DATE OF SERVICE:  01/29/2020



SUBJECTIVE:  No changes overnight.  The patient continues to have discomfort from

the abscess site.  He denies fevers, nausea, or dysuria. 



OBJECTIVE:  VITAL SIGNS:  Afebrile.  Vitals stable. 

GENERAL:  No acute distress. 

LUNGS:  Unlabored breathing. 

ABDOMEN:  Soft, nontender, and nondistended. 



Scrotal exam is normal.  Abscess site in the perineum is healing nicely.  He

continues to have induration, which is expected at this point after drainage.  There

is no fluctuance or erythema.  There is certainly no crepitus. 



ASSESSMENT AND PLAN:  Postoperative day 3, incision and drainage of perineal

abscess. 



CT scan has been reviewed.  The changes noted are consistent with a previously

drained, irrigated, packed abscess cavity.  His exam is benign.  He can go home with

antibiotics for the next week, and I will follow up between 1 and 2 weeks to ensure

that he is recovering well. 







Job ID:  [id number]

## 2020-01-30 NOTE — CON
DATE OF CONSULTATION:  



Consult from the emergency department.



HISTORY OF PRESENT ILLNESS:  This patient is a 29-year-old male who is known to 
me

as he was just discharged from this facility yesterday by me.  The patient had 
been

admitted to the hospital for a left perineal abscess.  The patient seemed to 
have a

significant amount of pain related to abscess that superficially was about a cm 
and

a half with some subcutaneous induration about 3 x 4 cm.  The patient was 
started on

broad-spectrum antibiotics, IV pain medications.  He had incision and drainage 
at

the bedside by Dr. Carey.  He subsequently had some improvement, although he

continued to report significant amounts of pain.  He continued to run very high

blood sugars, the patient is on an unusual regimen of insulin at home in which 
he

only takes 70/30 and he uses that in the morning and gives additional doses with

each meal throughout the day.  He said he had been on metformin previously, but 
it

did not work, so it had been discontinued.  Repeat CT scan failed to show any 
new

findings and Dr. Carey felt that it was normal findings for post I and D.  
He

was felt to be appropriate for discharge to home.  Continued to try to manage 
his

blood sugars; however, he was confident that he would be able to manage them 
better

at home because he managed his diet differently than he could in the hospital.  
He

was therefore discharged today.  The patient presented back to the emergency

department.  He had reported increased pain and had some drainage that was 
clearish

orange.  He denies any fevers.  He does report that his blood sugars have 
continued

to run high and he increased his insulin today to 80 units of 70/30 this 
morning and

55 units with each meal.  The patient reports he did go back to work and he was

clearly wearing blue jeans, which we had encouraged him not to do. 



PHYSICAL EXAMINATION:

On his exam today, 

HEART:  Regular. 

LUNGS:  Clear bilaterally. 

ABDOMEN:  Soft, nontender. 

:  Reveals essentially no significant change from yesterday.  There is an

approximately 1.5 cm incision where the superficial pocket had been present.  
When

the deeper indurated area is compressed, there is expression of some 
serosanguineous

fluid from that opening, but no pus. 

:  The patient's scrotum appears normal.  Testicle exam appears to be 
basically

normal, although he reports some tenderness of the left testicle.  The testicle 
is

normal shape and size, symmetric to the right testicle.  There are no masses

present.  There is no significant erythema of the scrotal area.  He does appear 
to

have a bit of tinea cruris. 



LABORATORY DATA:  White count 4.9, hemoglobin 15, platelets 169.  Sodium 135,

potassium 4.4, BUN 11, creatinine is 1.1, glucose was 415, AST 18, ALT 31.  LFTs

normal. 



IMPRESSION AND PLAN:  

1. Left perineal abscess status post I and D.  Discussed this case with Dr. Carey.  Again, he feels the patient is progressing as expected post-I&D.

He had a followup with the patient in 2 weeks.  However, given the

patient's concerns, he will see the patient early next week.  I discussed this 
with

the ER provider.

At discharge, the patient is to continue with the p.o. antibiotics and follow up

with Dr. Carey next week as discussed with the patient. He was encouraged 
again 

to wear loose clothing that does not rub this area and cause more irritation. 
He has 

been wearing blue jeans as that is what he has here today.  Encouraged him to 
stay 

off work a few days (until seen by Dr. Carey) if that is possible to avoid 
further 

irritation.  

2. Uncontrolled diabetes.  The patient was again strongly encouraged to follow 
up

with a primary care provider.  He tells me he has been getting his insulin 
filled

without a prescription at [facility], but recommended possible [facility] 
or

[facility] so that he can get followup soon in order to get 
some

assistance with controlling these medications.  ER physician has given insulin 
here

to address the current hyperglycemia.  Also recommended to the ER physician

that he be discharged with a new prescription for metformin 500 mg b.i.d. 

3. Tinea Cruris.  Discussed with ER physician and recommended he discuss 

treatment of this condition with patient as well.  Appears mild and early.  

4. Disposition.  Plan was discussed with the ED physician and with the patient.

He understands the plan.  All questions were answered. 







Job ID:  [id number]



[facility]

## 2020-01-30 NOTE — PQF
[employer] Win[employer] ViewerNAVI JACOBSEN EVAN

[id number]                                                             

[id number]                             

                                   

CLINICAL DOCUMENTATION CLARIFICATION FORM:  POST DISCHARGE



Addendum to original discharge summary date:  ____________2/4/2020______________
____________



Late entry note date:  _____________________________2/4/2020____________________
__________











DATE:  01/30/2020                                                  ATTN: 
JANELLE GAR





Please exercise your independent, professional judgment in responding to the 
clarification form. 

Clinical indicators are provided on the bottom of this form for your review





Please check appropriate box(s) to clarify if the following diagnosis has been 
ruled in or ruled out:

_______SEPSIS___________(CDI/Coding list diagnosis here)



[ x ] Ruled in diagnosis

     [ x ] Continue to treat        [  ] Resolved

[  ] Ruled out diagnosis

[  ] Cannot rule out diagnosis

[  ] Other diagnosis ___________

[  ] Unable to determine



In addition, please specify:

Present on Admission (POA):  [  x] Yes             [  ] No             [  ] 
Unable to determine



For continuity of documentation, please document condition throughout progress 
notes and discharge summary.  Thank You.



CLINICAL INDICATORS - SIGNS / SYMPTOMS / LABS

Severe Sepsis - Documented in ED report pg#9

WBC level 4.3 on 01/19 - Documented in Laboratory

pulse rate 112 on 01/26 and 104 on 01/27 - Documented in Vital signs

Elevated Lactic Acid 2.8 on 01/26 - Documented in Laboratory



RISK FACTORS

Perineal Abscess - Documented in Consult note on 01/26 by JANELLE GAR

DM

I&D perineal abscess - Documented in Consult note on 01/26 by JANELLE GAR



TREATMENTS

Vancomycin IVBP - Documented in Medication report



[employer]

(This form is maintained as a part of the permanent medical record)

2015 [employer].  All Rights Reserved

[email or web address]    [phone]
[phone]

                                                              



 









ANABELLE

## 2020-02-29 NOTE — RAD
Portable chest:



HISTORY: Cough



COMPARISON:  none



FINDINGS: Lung fields are clear. Heart and mediastinum appear unremarkable.  Vascularity is normal.

 Visualized osseous structures unremarkable.



IMPRESSION: No acute finding



Reported By: Bryan Lopez 

Electronically Signed:  2/29/2020 2:20 PM

## 2020-02-29 NOTE — ULT
TESTICULAR ULTRASOUND:

 

Date:  02/29/2020

 

HISTORY:  

Left testicle pain. 

 

FINDINGS:

Both testicles have a normal sonographic appearance. Testicles have normal and symmetric size. Color 
Doppler and spectral analysis demonstrates blood flow to both testicles. Small bilateral hydroceles. 


 

Small epididymal cyst on the left measuring 5-6 mm. 

 

There is echogenicity and enlargement of the left inguinal canal at the site of pain. This could pote
ntially represent an inguinal hernia. 

 

IMPRESSION: 

1.  Small bilateral hydroceles. Testicles are unremarkable. 

2.  Echogenicity and enlargement of the left inguinal ring. Possible inguinal hernia. Recommend clini
jesse correlation. 

 

 

POS: LINNETTE

## 2020-02-29 NOTE — CT
CT PELVIS WITH IV CONTRAST:

2/29/20

 

INDICATIONS: 

Peroneal abscess. 

 

Comparison made to CT pelvis performed yesterday. Gas and inflammatory change seen in the subcutaneou
s tissues upper left thigh at the base of the scrotum. 

 

Today's findings: 

There continues to be inflammatory change at the baes of the scrotum extending into the subcutaneous 
tissues of the medial superior left thigh. There is more confluence today and there may be developing
 abscess in this region. The gas densities seen yesterday are not as apparent today. 

 

The pelvic organs and structures are unremarkable and unchanged. No other interval change. 

 

IMPRESSION:

Inflammatory change at the base of the scrotum on the left extending into the subcutaneous tissues of
 the superior medial left thigh as described above. 

 

POS: MARGARET

## 2020-02-29 NOTE — HP
PRIMARY CARE PROVIDER:  None.



CHIEF COMPLAINT:  Left thigh pain.



HISTORY OF PRESENT ILLNESS:  Mr. Flores is a pleasant 29-year-old gentleman who was

seen at [facility] on February 29, 2020.  He was hospitalized

at this facility from January 26th through January 29th of this year for perineal

abscess, uncontrolled diabetes, and hypertriglyceridemia. 



He reports that he presented to the emergency room a couple of times after that

because of pain in the thigh.  He reports that he was doing well last night.  Today

morning, he woke up with left thigh pain and pain over the left side of his scrotum.

 He describes it as sharp, currently 9/10, pain is starting in the thigh and

radiating to the scrotum, no known aggravating or relieving factors, not accompanied

by fevers, nausea, or vomiting.  He denies any dysuria. 



REVIEW OF SYSTEMS:  All systems were reviewed and found to be negative except for

the pertinent positives mentioned above. 



PAST MEDICAL HISTORY:  Diabetes mellitus type 1 and recent perineal abscess.



PAST SURGICAL HISTORY:  Sacral cyst surgery.



SOCIAL HISTORY:  The patient chews tobacco.  He denies alcohol use or recreational

drug use. 



FAMILY HISTORY:  Diabetes mellitus in his maternal grandmother.



ALLERGIES:  NO KNOWN DRUG ALLERGIES.



CURRENT MEDICATIONS:  

1. Metformin 500 mg 2 times a day.

2. Novolin 70/30 insulin, dose unknown.



PHYSICAL EXAMINATION:

GENERAL:  On examination, Mr. Flores is awake and alert, not in acute distress.  He

is obese. 

VITAL SIGNS:  Blood pressure is 129/74, pulse 84, respiratory rate 16, and oxygen

saturation 97% on room air.  He is afebrile. 

EYES:  No scleral icterus, no conjunctival pallor. 

ENT:  Moist mucosal membranes.  No oropharyngeal erythema or exudates. 

NECK:  Supple, nontender.  Trachea is midline. 

RESPIRATORY:  Accessory muscles of breathing are not active.  Chest wall movements

are symmetric bilaterally.  Lungs are clear to auscultation without wheeze, rhonchi,

or crepitations. 

CARDIOVASCULAR:  S1 and S2 are heard, regular.  Peripheral pulses palpable. 

ABDOMEN:  Soft, nontender, bowel sounds are heard. 

NEUROLOGIC:  Cranial nerves 2 through 12 are intact. 

MUSCULOSKELETAL:  Power is 5/5 in all 4 extremities. 

SKIN:  He has rash over the left upper thigh and over the posterior aspect of the

left side of scrotum.  There is induration and mild fluctuance over the thigh

lesion.  Both areas are warm and tender to touch. 

LYMPHATIC:  No cervical lymphadenopathy. 

PSYCHIATRIC:  Normal mood, normal affect.  The patient is oriented to person, place,

and time. 



DIAGNOSTIC STUDIES:  Mr. Flores's labs and investigations were reviewed.  EKG by my

review shows sinus tachycardia, no ST changes to suggest an acute coronary syndrome.

 Then, chest x-ray by my review does not show any pulmonary infiltrates. 



Testicular ultrasound showed small bilateral hydroceles, echogenicity and

enlargement of the left inguinal ring, possible inguinal hernia. 



CT scan of the pelvis with IV contrast showed inflammatory change at the base of the

scrotum on the left, extending into the subcutaneous tissues of the superior medial

left thigh. 



He has an unremarkable CBC, decreased sodium of 133, normal potassium, normal

creatinine, unremarkable LFTs, CK decreased at 28, normal troponin I and normal

lactic acid.  Urinalysis is negative for nitrite and leukocyte esterase, positive

for glucose and ketones.  Beta-hydroxybutyrate level is normal at 0.25. 



ASSESSMENT AND PLAN:  Mr. Flores is a pleasant 29-year-old gentleman who was seen at

[facility] on February 29, 2020.  His problem list includes: 

1. Cellulitis and abscess:  Mr. Flores is presenting with cellulitis and abscess of

the left upper thigh with some extension into the scrotum.  He has been started on

cefepime and vancomycin, which I will continue.  General Surgery Service has been

consulted for opinion and help with management. 

2. Diabetes mellitus, type 1:  I will start him on moderate insulin sliding scale.

We will clarify home medications and restart insulin when he is able to take that;

otherwise, we will start at decreased dose in case he is n.p.o. 

3. Hyponatremia:  Mild, likely asymptomatic.

4. Tobacco abuse:  The patient has been counseled regarding tobacco cessation.



LEVEL OF RISK:  High.



LEVEL OF COMPLEXITY:  High.







Job ID:  [id number]

## 2020-03-01 NOTE — OP
DATE OF PROCEDURE:  02/29/2020



PREOPERATIVE DIAGNOSIS:  Recurrent left perineal abscess.



PROCEDURE PERFORMED:  Incision and drainage of left perineal abscess.



INDICATIONS:  This is a 29-year-old male, type 1 diabetic who over the last 6 weeks

has had 3 I and D's of the same abscess, came back with a recurrence. 



FINDINGS:  5 x 3 x 2 cm abscess cavity, completely unroofed.



DESCRIPTION OF PROCEDURE:  After informed consent was obtained, patient was taken to

the operating room and given general endotracheal anesthesia.  He was placed in

lithotomy position.  His perineum was prepped and draped in usual fashion.  An

elliptical incision was performed that was 5 cm long x 2.5 cm wide.  The skin was

excised, entering abscess cavity was thick brownish purulent fluid.  This was sent

for culture and sensitivity.  Skin excised into the deep subcutaneous tissue.  The

actual depth of the abscess cavity was about 2 cm.  Hemostasis was achieved

utilizing electrocautery.  Then, the wound was thoroughly irrigated and packed open

with Betadine gauze, covered by sterile dry gauze and zena-pants.  The patient was

then transferred to Recovery in good condition. 







Job ID:  [id number]

## 2020-03-01 NOTE — HP
CHIEF COMPLAINT:  Recurrent perineal abscess.



HISTORY OF PRESENT ILLNESS:  This is a 29-year-old male, type 1 diabetic who a month

ago had a limited I and D of a perineal abscess, but it has recurred, much worse now

over the last 12 hours.  He says he has had 3 I and D's in the last 2 months. 



PAST MEDICAL HISTORY:  Diabetes.



PAST SURGICAL HISTORY:  I and D x3.



MEDICATIONS:  

1. Metformin.

2. Insulin.



ALLERGIES:  NO KNOWN DRUG ALLERGIES.



SOCIAL HISTORY:  He is single.  He works as an .  No tobacco or alcohol.



FAMILY HISTORY:  Diabetes.



PHYSICAL EXAMINATION:

VITAL SIGNS:  Afebrile, pulse 89, and blood pressure 102/63. 

GENERAL:  He is uncomfortable, but he is awake, alert, and oriented. 

HEENT:  Unremarkable. 

LUNGS:  Clear. 

HEART:  Regular rate and rhythm. 

ABDOMEN:  Soft, nontender.  Perineum has a 6 x 4 cm indurated area, left groin

crease, very tender and tense. 



LABORATORY DATA:  White count 7.1, H and H 15 and 44, platelet count 111.

Electrolytes; elevated glucose at 277. 



ASSESSMENT:  Perineal abscess.



PLAN:  I and D in the OR.



CONSENT:  I have discussed the planned procedure as well as risk of bleeding,

infection, and recurrence.  He understands and gives informed consent. 







Job ID:  [id number]

## 2020-03-01 NOTE — PDOC.HOSPP
- Subjective


Encounter Date: 03/01/20


Encounter Time: 11:00


Subjective: 


Patient with continued severe pain in left inguinal area, no systemic symptoms.





- Objective


Vital Signs & Weight: 


 Vital Signs (12 hours)











  Temp Pulse Resp BP Pulse Ox


 


 03/01/20 00:30   97  16  133/62 


 


 02/29/20 21:00   99  18  150/59 H  97


 


 02/29/20 20:30  97.9 F  84  16  141/64 H  98








 Weight











Weight                         279 lb 15.793 oz














Result Diagrams: 


 03/01/20 05:38





 03/01/20 05:37


Additional Labs: 


 Accuchecks











  02/29/20 02/29/20





  18:21 14:05


 


POC Glucose  198 H  277 H














Hospitalist ROS





- Review of Systems


Constitutional: denies: fever, chills


Respiratory: denies: cough, shortness of breath


Cardiovascular: denies: chest pain, palpitations


Gastrointestinal: denies: nausea, vomiting, abdominal pain


Genitourinary: denies: dysuria, hematuria





- Medication


Medications: 


Active Medications











Generic Name Dose Route Start Last Admin





  Trade Name Freq  PRN Reason Stop Dose Admin


 


Hydrocodone Bitart/Acetaminophen  2 tab  02/29/20 19:05  03/01/20 06:36





  [place] 10/325  PO   2 tab





  Q4H PRN   Administration





  Severe Pain (7-10)   





     





     





     


 


Famotidine  20 mg  02/29/20 21:00  02/29/20 20:46





  Pepcid  PO   20 mg





  Q12HR SEBASTIAN   Administration





     





     





     





     


 


Famotidine  20 mg  02/29/20 21:00  02/29/20 21:13





  Pepcid  SLOW IVP   Not Given





  Q12HR SEBASTIAN   





     





     





     





     


 


Cefepime HCl 2 gm/ Sodium  100 mls @ 200 mls/hr  03/01/20 06:00  03/01/20 06:32





  Chloride  IVPB   100 mls





  0600,1800 SEBASTIAN   Administration





     





     





     





     


 


Sodium Chloride  1,000 mls @ 100 mls/hr  02/29/20 19:15  03/01/20 06:50





  Normal Saline 0.9%  IV   Not Given





  .Q10H SEBASTIAN   





     





     





     





     


 


Insulin Human Lispro  0 units  02/29/20 18:18  03/01/20 06:30





  Humalog  SC   10 unit





  .MODERATE SLIDING SC PRN   Administration





  Moderate Correctional Scale   





     





     





     


 


Ketorolac Tromethamine  30 mg  02/29/20 23:59  02/29/20 23:01





  Toradol  IVP  03/03/20 23:59  30 mg





  Q6HR SEBASTIAN   Administration





     





     





     





     


 


Morphine Sulfate  2 mg  02/29/20 19:05  02/29/20 23:02





  Morphine  SLOW IVP   2 mg





  Q2H PRN   Administration





  Moderate Pain (4-6)   





     





     





     


 


Morphine Sulfate  4 mg  02/29/20 19:05  03/01/20 07:50





  Morphine  SLOW IVP   4 mg





  Q2H PRN   Administration





  Severe Pain (7-10)   





     





     





     














- Exam


General Appearance: NAD, awake alert


General - other findings: obese


ENT: moist mucosa


Heart: RRR, no murmur, no gallops, no rubs


Respiratory: CTAB, no wheezes, no rales, no ronchi


Gastrointestinal: soft, non-tender, non-distended, normal bowel sounds


Skin - other findings: left perineum with minimal redness, TTP, packed abscess


Psychiatric: normal affect, normal behavior, A&O x 3





Hosp A/P


(1) Diabetes type 1, uncontrolled


Code(s): E10.65 - TYPE 1 DIABETES MELLITUS WITH HYPERGLYCEMIA   Status: Acute   





(2) Perineal abscess


Code(s): L02.215 - CUTANEOUS ABSCESS OF PERINEUM   Status: Acute   





(3) Hypertriglyceridemia


Code(s): E78.1 - PURE HYPERGLYCERIDEMIA   Status: Acute   





(4) Splenomegaly


Code(s): R16.1 - SPLENOMEGALY, NOT ELSEWHERE CLASSIFIED   Status: Acute   





- Plan





S/P I&D by Dr. Hankins


On Cefepime and Vancomycin since 2/29/2020


Blood sugars massively elevated this AM


Eating well so will resume long acting insulin


ISS


DVT Proph: Lovenox, SCDs

## 2020-03-02 NOTE — PDOC.HOSPP
- Subjective


Encounter Date: 03/02/20


Subjective: 





Feels ok.  Still has pain.  Nurse reports regular us of the morphine and po 

meds.  





- Objective


Vital Signs & Weight: 


 Vital Signs (12 hours)











  Temp Pulse Resp BP Pulse Ox


 


 03/02/20 11:55  97.7 F  68  20  140/92 H 


 


 03/02/20 08:13  97.7 F  73  20  151/75 H  95








 Weight











Admit Weight                   279 lb 15.793 oz


 


Weight                         279 lb 15.793 oz














I&O: 


 











 03/01/20 03/02/20 03/03/20





 06:59 06:59 06:59


 


Intake Total  960 


 


Output Total  1550 


 


Balance  -590 











Result Diagrams: 


 03/01/20 05:38





 03/01/20 05:37


Additional Labs: 


 Accuchecks











  03/02/20 03/02/20 03/02/20





  16:47 11:10 06:39


 


POC Glucose  286 H  224 H  321 H














  03/01/20





  20:44


 


POC Glucose  207 H














Hospitalist ROS





- Medication


Medications: 


Active Medications











Generic Name Dose Route Start Last Admin





  Trade Name Freq  PRN Reason Stop Dose Admin


 


Hydrocodone Bitart/Acetaminophen  2 tab  02/29/20 19:05  03/02/20 00:22





  [place] 10/325  PO   2 tab





  Q4H PRN   Administration





  Severe Pain (7-10)   





     





     





     


 


Enoxaparin Sodium  40 mg  03/01/20 09:00  03/02/20 08:16





  Lovenox  SC   40 mg





  0900 SEBASTIAN   Administration





     





     





     





     


 


Famotidine  20 mg  02/29/20 21:00  03/02/20 08:16





  Pepcid  PO   20 mg





  Q12HR SEBASTIAN   Administration





     





     





     





     


 


Famotidine  20 mg  02/29/20 21:00  03/02/20 10:23





  Pepcid  SLOW IVP   Not Given





  Q12HR SEBASTIAN   





     





     





     





     


 


Cefepime HCl 2 gm/ Sodium  100 mls @ 200 mls/hr  03/01/20 06:00  03/02/20 16:54





  Chloride  IVPB   100 mls





  0600,1800 SEBASTIAN   Administration





     





     





     





     


 


Sodium Chloride  1,000 mls @ 100 mls/hr  02/29/20 19:15  03/02/20 06:44





  Normal Saline 0.9%  IV   1,000 mls





  .Q10H SEBASTIAN   Administration





     





     





     





     


 


Vancomycin HCl 2 gm/ Sodium  500 mls @ 250 mls/hr  03/01/20 10:00  03/02/20 18:

43





  Chloride  IVPB   500 mls





  0200,1000,1800 SEBASTIAN   Administration





     





     





     





     


 


Insulin Glargine 60 units/  0.6 mls @ 0 mls/hr  03/02/20 09:00  03/02/20 10:29





  Miscellaneous Medication  SC   0.6 mls





  QAM SEBASTIAN   Administration





     





     





     





     


 


Insulin Human Lispro  0 units  02/29/20 18:18  03/02/20 06:48





  Humalog  SC   8 unit





  .MODERATE SLIDING SC PRN   Administration





  Moderate Correctional Scale   





     





     





     


 


Insulin Human Regular  30 units  03/01/20 11:30  03/02/20 16:53





  Humulin R  SC   30 unit





  AC SEBASTIAN   Administration





     





     





     





     


 


Ketorolac Tromethamine  30 mg  02/29/20 23:59  03/02/20 19:47





  Toradol  IVP  03/03/20 23:59  30 mg





  Q6HR SEBASTIAN   Administration





     





     





     





     


 


Morphine Sulfate  2 mg  02/29/20 19:05  03/02/20 08:15





  Morphine  SLOW IVP   2 mg





  Q2H PRN   Administration





  Moderate Pain (4-6)   





     





     





     


 


Morphine Sulfate  4 mg  02/29/20 19:05  03/02/20 16:53





  Morphine  SLOW IVP   4 mg





  Q2H PRN   Administration





  Severe Pain (7-10)   





     





     





     


 


Ondansetron HCl  4 mg  02/29/20 19:05  03/02/20 10:28





  Zofran  IVP   4 mg





  Q6H PRN   Administration





  Nausea/Vomiting   





     





     





     














- Exam


General Appearance: NAD, awake alert


Heart: RRR, no murmur, no gallops, no rubs, normal peripheral pulses


Respiratory: CTAB, no wheezes, no rales, no ronchi, normal chest expansion, no 

tachypnea, normal percussion


Gastrointestinal: soft, non-tender, non-distended, normal bowel sounds, no 

palpable masses, no hepatomegaly, no splenomegaly, no bruit


Extremities: no cyanosis, no clubbing, no edema


Skin: normal turgor


Musculoskeletal: normal tone


Psychiatric: normal affect, normal behavior, A&O x 3





Hosp A/P


(1) Diabetes type 1, uncontrolled


Code(s): E10.65 - TYPE 1 DIABETES MELLITUS WITH HYPERGLYCEMIA   Status: Acute   





(2) Hypertriglyceridemia


Code(s): E78.1 - PURE HYPERGLYCERIDEMIA   Status: Acute   





(3) Perineal abscess


Code(s): L02.215 - CUTANEOUS ABSCESS OF PERINEUM   Status: Acute   





(4) Splenomegaly


Code(s): R16.1 - SPLENOMEGALY, NOT ELSEWHERE CLASSIFIED   Status: Acute   





- Plan





Continue IV abx.


Follow up cultures. 


Wound care team.


Pain management.





Glucose remains poorly controlled.


Says it is much better at home, but has never been good here.

## 2020-03-04 NOTE — DIS
DATE OF ADMISSION:  02/29/2020



DATE OF DISCHARGE:  03/03/2020



DISCHARGE DIAGNOSES:  

1. Left perineal abscess.

2. Diabetes mellitus, uncontrolled.

3. Hypertriglyceridemia.

4. History of splenomegaly.



HISTORY OF PRESENT ILLNESS:  This patient is a 29-year-old male, whom I have

admitted previously for a left perineal abscess just posterior to the scrotal 
area

that was initially seen by Urology, had I and D and was subsequently discharged 
on

p.o. antibiotics, came back to the emergency room a couple of days later 
because he

was still having some serous type drainage from the area.  He was again 
discharged

to continue with the antibiotics and follow up with Urology.  The patient has

presented back on this occasion with worsening symptoms. 



HOSPITAL COURSE:  The patient had a testicular ultrasound, which was 
unremarkable

due to some scrotal edema.  CT scan of the pelvis showed inflammatory changes 
at the

base of the scrotum on the left extending into the subcutaneous tissue of the

superior medial thigh.  He was seen by Surgery, Dr. Hankins, performed a wide 
excision

of the abscess area.  Subsequently, the patient's culture grew a Streptococcus 
group

C.  The patient felt well other than having discomfort associated with the 
lesion.

His blood sugars remained somewhat high, although he had better control than he 
had

on his previous admission and the patient is on an unusual outpatient regimen of

70/30 dosing multiple times a day.  Once the patient was feeling better, 
ambulating

and it the culture results known, he was felt to be stable for discharge home. 



PHYSICAL EXAMINATION:

VITAL SIGNS:  On the day of discharge, temperature is 98.3, pulse 71, 
respirations

20, O2 saturation 93% to 95% on room air.  His BP was 149/92. 

GENERAL:  He is awake and alert. 

HEART:  Regular rate and rhythm. 

LUNGS:  Clear bilaterally. 

ABDOMEN:  Soft, nontender, and nondistended. 

EXTREMITIES:  No cyanosis, clubbing, or edema. 

:  The wound area was covered with gauze without healing of inflammation.



DISPOSITION:  The patient was discharged home.



DISCHARGE MEDICATIONS:  He will be on;

1. Keflex 500 mg b.i.d.

2. Zofran ODT 8 mg t.i.d. p.r.n. 

He will continue his usual home regimen of;

1. 70/30 insulin, which he takes 60 units subcu daily and 30 units before 
meals. 

2. We will continue with tramadol.



ACTIVITY:  He is to have activity as tolerated, although he is encouraged to 
stay

off any work until March 17th. 



DISCHARGE INSTRUCTIONS:  He was again strongly encouraged to establish with a

primary care provider, who can follow him up for this particular admission and 
help

manage his diabetes long-term. 



TIME SPENT:  Total time in discharge activities is greater than 30 minutes.







Job ID:  [id number]



MTDD

## 2020-03-04 NOTE — CT
PRELIMINARY REPORT/[facility]/EMERGENCY AFTER HOURS PROCEDURE: 

 

EXAM: CTA Chest with Intravenous Contrast 

 

CLINICAL HISTORY: 

This is a 28 yo male who was recently discharged from the hospital yesterday for treatment of groin a
bscess. He states yesterday he started coughing up blood. A friend with him states that he was pacing
 the room and appeared pale. He reports some chest pain and SOB 

 

TECHNIQUE: 

Axial CTA images of the chest with intravenous contrast. MIP reconstructed images were created and re
viewed. 

 

CONTRAST: 

With; ISOVUE 370,100mL 

 

COMPARISON: 

None provided. 

 

FINDINGS: 

 

PULMONARY ARTERIES 

There is no intraluminal filling defect suspicious for PE. 

 

AORTA 

No thoracic aortic aneurysm or dissection. 

 

LUNGS 

Scattered patchy/nodular and confluent moderate parenchymal densities/consolidation with predominant 
involvement of the left midlung zone, correlate for atypical pneumonia; differential might include pn
eumonitis, alveolar hemorrhage, ARDS; follow-up to resolution. 

 

PLEURAL SPACES 

No pleural effusion. No pneumothorax. 

 

HEART AND MEDIASTINUM 

No cardiomegaly. No significant pericardial effusion. 

 

LYMPH NODES 

No lymphadenopathy. 

 

BONES 

No focal osseous abnormality or acute fracture. 

 

CHEST WALL AND UPPER ABDOMEN 

Moderate hepatic steatosis. 

 

IMPRESSION: 

 

1. No evidence of acute pulmonary artery embolism. 

2. Scattered patchy/nodular and confluent moderate parenchymal densities/consolidation with predomina
nt involvement of the left midlung zone, correlate for atypical pneumonia; differential might include
 pneumonitis, alveolar hemorrhage, ARDS; follow-up to resolution.

 

 

ELECTRONICALLY SIGNED BY:

Keith Moralez MD

Mar 4, 2020 3:13:13 AM CST

 

This report is intended for review by the ordering physician only, in accordance of law. If you recei
ve this report in error, please call [facility] at [phone].

 

 

 

FINAL REPORT 

 

EMERGENCY AFTER HOURS CTA CHEST WITH CONTRAST:

 

FINDINGS/IMPRESSION: 

I agree with the findings and impression given in the preliminary report per [facility] physici
an. 

 

1.  No evidence of pulmonary thromboembolism. 

2.  Multifocal infiltrates.

## 2020-03-04 NOTE — HP
CHIEF COMPLAINT:  Cough and shortness of breath.



HISTORY OF PRESENT ILLNESS:  This patient is a 29-year-old male with a history of

very poorly-controlled diabetes in general, who also has a history of some skin

issues akin to hidradenitis with prior perineal abscesses.  The patient has been

dealing with a perineal abscess posterior to left scrotum for the past several

weeks.  He initially presented here, had a bedside I and D by Urology, was treated

with IV and then p.o. antibiotics and discharged.  Subsequently came back to the

emergency department because of some persistent drainage and discomfort.  However,

the wound looked good.  He was again discharged to follow up with Urology.  The

patient has not had resolution and apparently got worse.  The patient was readmitted

here on 02/29 at which time he was seen by General Surgery, had general anesthesia

and excision of the entire abscess area.  The patient did fine subsequent.  He grew

a group C strep and remained afebrile and was ultimately felt to be appropriate for

discharge to home on p.o. antibiotics.  He was discharged on Keflex in good

condition on 03/03/20.  The patient then went home, said he slept most of the day,

but then when he woke up, he was experiencing a harsh cough and some shortness of

breath and subsequently presented here to the emergency department.  The patient

also reported that he was having some hemoptysis and per the ER record, he had a

friend with him who said he was pacing the room and appeared pale and he also

reported some chest discomfort. 



REVIEW OF SYSTEMS:  The patient apparently reported fevers, chills and nausea to the

emergency department.  On my questioning, says he was not having any fevers and

denies any significant chest pain.  All other systems reviewed.  All pertinent

positives and negatives noted in the history of present illness or otherwise

negative. 



PAST MEDICAL HISTORY:  Notable for the above mentioned type 1 diabetes.  He also has

some associated hypertriglyceridemia.  He also has a history of some splenomegaly

with some associated thrombocytopenia.  He previously had a workup including flow

cytometry, which is negative for evidence of myeloproliferative disorder or

malignancy.  He also has a history of some prior pancreatitis of unclear etiology. 



FAMILY HISTORY:  Hypertension in his father.  His mother had lupus, diabetes, and

hidradenitis suppurativa. 



SOCIAL HISTORY:  The patient denies alcohol, drugs, or tobacco.  He denies vaping or

inhaling any other foreign substances.  He is single.  He is full code.  His father,

Demond Ramírez would be his surrogate decision maker should that become necessary. 



ALLERGIES:  NONE.



CURRENT MEDICATIONS:  

1. Metformin 500 mg b.i.d. and 70/30 insulin with apparently 70 units in the morning

and 30 units with meals. 

2. Keflex 500 mg p.o. b.i.d., of which he only took one after discharge.



PHYSICAL EXAMINATION:

VITAL SIGNS:  Initially BP was 202/99, pulse 87, respirations were 22 and he was 86%

on room air.  Most recent documented vitals, BP is 163/99, pulse 86, respirations

19, O2 saturation 95% on 4 L. 

GENERAL APPEARANCE:  Age-appropriate male.  He is currently in no distress.  He has

nasal cannula in place.  He is coughing frequently and producing a thin red sputum.

He is otherwise able to speak in full sentences. 

HEENT:  PERRL.  No OP lesions. 

NECK:  Supple and symmetric. 

HEART:  Regular rate and rhythm without murmurs, gallops, or rubs. 

LUNGS:  Diffuse rales bilaterally, seems to be a little bit worse on the left at

present.  No wheezes or rhonchi. 

ABDOMEN:  Soft, nontender, and nondistended.  Positive bowel sounds.  No masses.  No

organomegaly. 

EXTREMITIES:  No cyanosis, clubbing, or edema.  Peripheral pulses are present. 

:  Reveals the surgical defect in the left perineal area, which appears otherwise

healthy. 

PSYCHIATRIC:  Normal affect and behavior. 

NEUROLOGIC:  No focal deficits.  Cognitively intact.  All extremities move

spontaneously.  Cranial nerves are intact. 



LABORATORY DATA:  White count 8.2, hemoglobin 15.8, platelets 141.  D-dimer 0.39.

Sodium 139, potassium 3.8, chloride 101, CO2 is 28, BUN 11, creatinine 0.85, glucose

178, lactic acid 1.5, calcium 9.2.  AST 16, ALT 33, alkaline phosphatase 92.

Urinalysis only shows 4 to 6 white cells, otherwise negative. 



CTA of the chest reveals scattered patchy/nodular and confluent moderate parenchymal

density/consolidations with predominant involvement of the left mid lung zone,

possible atypical pneumonia.  Differential also including pneumonitis, alveolar

hemorrhage, ARDS. 



IMPRESSION AND PLAN:  

1. Acute hypoxic respiratory failure.  The patient has underlying pulmonary

infiltrates and hemoptysis.  We will maintain supportive oxygen.  Certainly

concerning he may get worse before he gets better. 

2. Pulmonary infiltrates, concerning for pneumonia.  We will therefore cover with

vancomycin and cefepime given that he just left the hospital.  Also concerning for

possible alveolar hemorrhage given that he is having the hemoptysis.  He is afebrile

and has a normal white count, we will check coags.  Also possible early ARDS per my

discussion with Dr. Coronel.  The case was were reviewed with him.  He will be

consulted as well. 

3. Status post surgical excision of left perineal abscess.  The wound appears to

look good.  We will continue wet-to-dry dressing changes. 

4. Diabetes mellitus.  The patient is on a very unusual regimen at home, taking

multiple doses of 70/30 throughout the day.  He reports he does not have a primary

care provider and has been getting his insulin without a prescription and going to

try to go with some Lantus in the morning and sliding scale high dose throughout the

day, keep him on a diabetic diet with Accu-Cheks. 

5. History of splenomegaly with thrombocytopenia,, appears to be generally stable

and his platelet count is currently normal.  Again, this was previously worked up

with flow cytometry, which was negative. 







Job ID:  [id number]

## 2020-03-04 NOTE — PQF
Devaughn Flores DAVID R MD

[id number]                                                             

[id number]                             

                                   

CLINICAL DOCUMENTATION CLARIFICATION FORM:  POST DISCHARGE



Addendum to original discharge summary date:  __________________________________
____



Late entry note date:  _________________________________________________________
__





Diabetes does not cause abscesses





DATE: 03/04/2020                                                       ATTN: 
SHABNAM BLAND MD



Please exercise your independent, professional judgment in responding to the 
clarification form. 

Clinical indicators are provided on the bottom of this form for your review



Please check appropriate box(s):



[  ] Perineal abscess due to Diabetes

[ x ] Perineal abscess not due to Diabetes

[  ] Other diagnosis ___________

[  ] Unable to determine









For continuity of documentation, please document condition throughout progress 
notes and discharge summary.  Thank You.





CLINICAL INDICATORS - SIGNS / SYMPTOMS / LABS



Recently admitted 1 month ag for abscess on left thigh due to complication of 
DM1-Documented in ED on02/29  by [place]

 DM with hyperglycemia-Documented in ED on 02/29 by [place]

Diabetes type 1 uncontrolled-Documented in Hospitalist progress note on  03/01 
by Reji Weller MD

Perineal abscess-Documented in OP note on 02/29 by Keith Hankins MD



RISKS:

Diabetes type 1 uncontrolled-Documented in Hospitalist progress note on  03/01 
by Reji Weller MD

Perineal abscess-Documented in OP note on 02/29 by Keith Hankins MD



TREATMENT:

I will start him on moderate insulin sliding scale -Documented in H&P on 02/29 
by Shabnam Arceo MD

Incision and drainage of left perineal abscess-Documented in OP note on 02/29 
by Keith Hankins MD

5x3x2 cm abscess cavity completely unroofed-Documented in OP note on 02/29 by 
Keith Hankins MD

Skin  excised into the deep subcutaneous tissue -Documented in OP note on 02/29 
by Keith Hankins MD

on cefepime and vancomycin since 02/29 -Documented in Hospitalist progress note 
on  03/01 by Reji Weller MD















[employer] Winform Viewer

(This form is maintained as a part of the permanent medical record)

2015 [employer].  All Rights Reserved

[email or web address]    [phone]
[phone]

                                                              



 

[email or web address]

## 2020-03-05 NOTE — PRG
DATE OF SERVICE:  03/05/2020



SUBJECTIVE:  This morning, he is awake, alert, and responsive.



OBJECTIVE:  VITAL SIGNS: Temperature 97, pulse 75, respiratory rate 18, saturations

98% on room air, blood pressure 154/78. 

CHEST:  No wheezing or crackles. 

CARDIAC:  Normal S1, S2.  No gallops. 

ABDOMEN:  No masses.



LABORATORY DATA:  Unremarkable.  Blood sugar is 411. 



X-ray shows much improvement, minimal infiltrate in the left mid chest.



ASSESSMENT:  Negative pressure edema, hemoptysis much improved, diabetes.



PLAN:  Discontinue Decadron, p.o. antibiotics for 5 days.  Discharge home any time.







Job ID:  [id number]

## 2020-03-05 NOTE — PRG
DATE OF SERVICE:  03/05/2020



SUBJECTIVE:  The patient is seen and examined at the bedside.  He feels bad.  He has

some sore throat and he is not able to eat.  He has nausea each time he eats.  He

complains about left perirectal area, which was incised by urologist. 



OBJECTIVE:  VITAL SIGNS:  Blood pressure is 154/78, pulse is 75, respiratory rate is

18, O2 saturation is 96% on room air, and temperature is 97.5. 

GENERAL:  His head is atraumatic and normocephalic.  Eyes are PERRLA.  Sclerae are

nonicteric.  Oral mucosa is moist. 

NECK:  Supple. 

LUNGS:  Clear. 

HEART:  S1 and S2 normal.  No S3.  No S4.  No any murmur. 

ABDOMEN:  Obese, nontender.  Bowel sounds are present.  No organomegaly. 

RECTAL:  Perirectal area shows status post incision with mild serosanguineous fluid

drainage. 

NEUROLOGICAL:  He is alert and oriented x4.  There are no any motor deficits.



LABORATORY DATA:  Showed normal CBC.  Normal chemistry except for glycemia, which is

not controlled ranging from 189 to 377 and up to 411.  Alkaline phosphatase 115.

Microbiology, two blood cultures negative.  Influenza A and B, [facility] testing

negative for both. 



IMPRESSION:  

1. Possible left lung pneumonia.

2. Uncontrolled diabetes.

3. Left perineal abscess, status post surgical incision.

4. Diabetes mellitus.

5. History of splenomegaly with thrombocytopenia, stable.



PLAN:  We are going to continue Accu-Cheks a.c. and at bedtime.  We will change his

mild sliding scale to aggressive.  We will continue his long-acting insulin, which

is insulin 

Lantus 40 units every morning.  We will continue vancomycin and cefepime for now.

We will get Wound Care.  I believe that this open area from the abscess should be

packed since this area is very difficult to manage and pain management with

hydrocodone. 







Job ID:  [id number]

## 2020-03-05 NOTE — RAD
PA AND LATERAL VIEWS CHEST:

 

HISTORY: 

CHF.

 

FINDINGS: 

The heart size is normal.  There are patchy infiltrates in the left lung.  No pneumothoraces or deadnre
 pulmonary edema are seen.  No acute osseous abnormalities identified.

 

IMPRESSION: 

Findings are suspicious for pneumonia.

 

POS: [facility]

## 2020-03-06 NOTE — PRG
DATE OF SERVICE:  03/06/2020



SUBJECTIVE:  This morning, awake, alert, and responsive.



OBJECTIVE:  VITAL SIGNS:  Temperature 97, pulse 73, respiratory rate 18, saturations

98% on room air, blood pressure 148/75. 

CHEST:  Decreased breath sounds.  No wheezing.  No coughing blood. 

CARDIAC:  Normal S1, S2.  No gallops. 

ABDOMEN:  No masses.



ASSESSMENT:  Negative pressure edema versus aspiration, all cultures negative,

morbid obesity, diabetes. 



PLAN:  Switch over to oral antibiotics.  Home any time.  Pulmonary will follow at a

distance. 







Job ID:  [id number]

## 2020-03-07 NOTE — DIS
DATE OF ADMISSION:  03/04/2020



DATE OF DISCHARGE:  03/06/2020



DIAGNOSIS AT THE TIME OF DISCHARGE:  

1. Negative pressure edema versus aspiration.

2. Uncontrolled diabetes mellitus type 1.

3. Left perineal abscess, status post recent surgical incision.

4. History of splenomegaly with thrombocytopenia, stable.



HOSPITAL COURSE:  The patient is a 29-year-old  male, who was admitted to

the hospital with cough and shortness of breath.  He has a history of very poorly

controlled diabetes mellitus in general and he has history of some skin issues akin

to hidradenitis with prior perineal abscesses.  He was recently diagnosed with

rectal abscess which was treated with incision and drainage.  Subsequently, he was

discharged on oral antibiotics at home and then he started having some harsh cough

with some shortness of breath and subsequently, he presented to the emergency room.

Apparently, he had some hemoptysis as per ER records.  At this time, his white count

was 8.2, hemoglobin 15.8, D-dimer is 0.39.  Electrolytes were within normal limits.

Lactic acid 1.5.  Kidney function was within normal limits.  Urinalysis showed 4 to

6 white cells, otherwise was negative.  CT angiogram of the chest revealed scattered

patchy/nodular and confluent moderate parenchymal density/consolidations with

predominant involvement of the left mid lung zone, possible atypical pneumonia.  The

patient got admitted to the hospital.  He was seen by Dr. Coronel for Pulmonary

evaluation, who recommended continuation of antibiotics and brief course of

Decadron.  The patient had followup chest x-ray the next day, which showed some

findings of possible pneumonia in the left lung.  Clinically, he improved quickly.

Subsequently, he was switched to oral Augmentin and was released by pulmonologist.

His glycemia was difficult to control since he was on steroids for short period of

time.  His glycemia is down to 200s today and clinically he is doing well.  He does

not require any oxygen.  He was seen by Dr. Coronel, who recommends to continue

Augmentin and discharge him home.  His area of peritoneal abscess, status post

incision and drainage, was packed and there is no any obvious evidence that there is

some infection.  There is some intertrigo around the area, so he is going to be

started on clotrimazole cream to use it for those areas.  He will have prescription

for Augmentin 875 mg twice a day for the next 7 days.  He will put himself on his

diabetic insulin regimen which he was taking prior to this hospitalization which is

70 units of long-acting insulin plus 30 units before each meal and he is going to

follow up with primary care physician in the next 3 to 4 days.  He will keep the

post incision perineal area clean, washed with water and soap and he will have

Tylenol No.3 for pain control. 







Job ID:  [id number]

## 2020-04-15 NOTE — RAD
Chest one view



HISTORY: Chest pain. Dyspnea.



COMPARISON: 3/5/2020.



FINDINGS: Cardiac silhouette and pulmonary vasculature are unremarkable. Mediastinum is midline. Left
 lung infiltrate has resolved.



No confluent airspace consolidation or evidence of pneumothorax. Cardiac monitor leads overlie the ch
est.



  



IMPRESSION :

No active cardiopulmonary abnormalities are demonstrated.



Reported By: KYLEE Dunbar 

Electronically Signed:  4/15/2020 10:11 PM

## 2020-04-18 NOTE — EKG
Test Reason : 

Blood Pressure : ***/*** mmHG

Vent. Rate : 103 BPM     Atrial Rate : 103 BPM

   P-R Int : 146 ms          QRS Dur : 084 ms

    QT Int : 318 ms       P-R-T Axes : 012 010 028 degrees

   QTc Int : 416 ms

 

Sinus tachycardia

Inferior infarct , age undetermined

Abnormal ECG

 

Confirmed by JOVANI CHOI DO ([id number]),  YENY MCCRACKEN ([id number]) on 4/18/2020 9:49:31 AM

 

Referred By:             Confirmed By:JOVANI COHI DO

## 2020-04-27 NOTE — CT
PRELIMINARY REPORT/[facility]/EMERGENCY AFTER HOURS PROCEDURE 

 

EXAM: CTA Chest with Intravenous Contrast 

 

CLINICAL HISTORY: PT REPORTS CHEST PAIN ONSET 30 MINS AGO, STATES HIS PAIN BEGAN AND SHORTLY AFTER HE
 BELIEVES HE HAD A SYNCOPAL EPISODE. 

 

TECHNIQUE: Axial CTA images of the chest with intravenous contrast. MIP reconstructed images were cre
ated and reviewed. 

 

CONTRAST: With; ISOVUE 370,100mL 

 

COMPARISON: CT  - CTA ANGIO CHEST W WO CON  - 03/04/2020 02:42 AM CST 

 

FINDINGS: 

PULMONARY ARTERIES  There is no intraluminal filling defect suspicious for PE. 

AORTA  No thoracic aortic aneurysm or dissection. 

LUNGS  Lung infiltrates seen on the prior examination have resolved. 

PLEURAL SPACES  No pleural effusion. No pneumothorax. 

HEART AND MEDIASTINUM  No cardiomegaly. No significant pericardial effusion. 

BONES  No focal osseous abnormality or acute fracture. 

UPPER ABDOMEN  Diffuse fatty infiltration of the liver, similar to prior. 

Marked nonspecific splenomegaly, similar to prior. 

 

IMPRESSION: 

1. There is no intraluminal filling defect suspicious for PE. 

2. Lung infiltrates seen on the prior examination have resolved. 

3. Diffuse fatty infiltration of the liver, similar to prior. 

4. Marked nonspecific splenomegaly, similar to prior.

 

ELECTRONICALLY SIGNED BY:

Yamil Rick MD

Apr 27, 2020 2:54:49 AM CDT

 

 

FINAL REPORT 

 

CT PULMONARY ANGIOGRAM WITH IV CONTRAST AND 3D POSTPROCESSING:

I agree with the preliminary report given by Dr. Yamil Rick of [facility].

## 2020-04-29 NOTE — EKG
Test Reason : 

Blood Pressure : ***/*** mmHG

Vent. Rate : 098 BPM     Atrial Rate : 098 BPM

   P-R Int : 144 ms          QRS Dur : 082 ms

    QT Int : 328 ms       P-R-T Axes : 023 027 055 degrees

   QTc Int : 418 ms

 

Normal sinus rhythm

ST elevation consider inferolateral injury or acute infarct

** ** ACUTE MI / STEMI ** **

No STEMI

Abnormal ECG

 

Confirmed by GOLDBERG M.D., ELVIS ([id number]),  JIM RAO ([id number]) on 4/29/2020 2:56:44 PM

 

Referred By:             Confirmed By:ADRIANA GOLDBERG M.D.

## 2020-05-01 NOTE — CT
CT OF THE BRAIN WITHOUT CONTRAST:

5/1/20

 

INDICATION:

History of generalized weakness and unresponsive episode. 

 

COMPARISON: 

None.

 

FINDINGS: 

No acute infarct, hemorrhage or hydrocephalus is present. Septum pellucidum and third ventricle are m
idline. Mastoid air cells and paranasal sinuses are clear. 

 

IMPRESSION: 

No acute intracranial abnormality. 

 

POS: [facility]

## 2020-05-01 NOTE — RAD
RADIOGRAPH CHEST 1 VIEW:



DATE:

5/1/2020



HISTORY:

29-year-old type I diabetic male with altered mental status: Found unresponsive. Concern for aspirati
on.



FINDINGS:

The visualized lung fields are clear. The cardiomediastinal silhouette and hilar shadows are normal. 
The lateral costophrenic angles are sharp. The osseous structures appear normal. There is no

pneumothorax.



IMPRESSION:

Negative.



Reported By: Michi Diaz 

Electronically Signed:  5/1/2020 2:43 PM

## 2021-03-29 ENCOUNTER — HOSPITAL ENCOUNTER (EMERGENCY)
Dept: HOSPITAL 92 - CSHERS | Age: 31
Discharge: HOME | End: 2021-03-29
Payer: COMMERCIAL

## 2021-03-29 DIAGNOSIS — Z21: ICD-10-CM

## 2021-03-29 DIAGNOSIS — F17.220: ICD-10-CM

## 2021-03-29 DIAGNOSIS — Z79.899: ICD-10-CM

## 2021-03-29 DIAGNOSIS — L03.112: Primary | ICD-10-CM

## 2021-03-29 DIAGNOSIS — E10.9: ICD-10-CM

## 2021-03-29 DIAGNOSIS — E66.9: ICD-10-CM

## 2021-03-29 LAB
ALBUMIN SERPL BCG-MCNC: 4.3 G/DL (ref 3.5–5)
ALP SERPL-CCNC: 91 U/L (ref 40–110)
ALT SERPL W P-5'-P-CCNC: 33 U/L (ref 8–55)
ANION GAP SERPL CALC-SCNC: 18 MMOL/L (ref 10–20)
AST SERPL-CCNC: 23 U/L (ref 5–34)
BASOPHILS # BLD AUTO: 0 10X3/UL (ref 0–0.2)
BASOPHILS NFR BLD AUTO: 0.4 % (ref 0–2)
BILIRUB SERPL-MCNC: 0.9 MG/DL (ref 0.2–1.2)
BUN SERPL-MCNC: 11 MG/DL (ref 8.9–20.6)
CALCIUM SERPL-MCNC: 9.4 MG/DL (ref 7.8–10.44)
CHLORIDE SERPL-SCNC: 97 MMOL/L (ref 98–107)
CO2 SERPL-SCNC: 20 MMOL/L (ref 22–29)
CREAT CL PREDICTED SERPL C-G-VRATE: 0 ML/MIN (ref 70–130)
EOSINOPHIL # BLD AUTO: 0.1 10X3/UL (ref 0–0.5)
EOSINOPHIL NFR BLD AUTO: 1 % (ref 0–6)
GLOBULIN SER CALC-MCNC: 3.8 G/DL (ref 2.4–3.5)
GLUCOSE SERPL-MCNC: 372 MG/DL (ref 70–105)
HGB BLD-MCNC: 15.5 G/DL (ref 13.5–17.5)
LYMPHOCYTES NFR BLD AUTO: 23.2 % (ref 18–47)
MCH RBC QN AUTO: 26.8 PG (ref 27–33)
MCV RBC AUTO: 75.6 FL (ref 81.2–95.1)
MONOCYTES # BLD AUTO: 0.3 10X3/UL (ref 0–1.1)
MONOCYTES NFR BLD AUTO: 6.3 % (ref 0–10)
NEUTROPHILS # BLD AUTO: 3.5 10X3/UL (ref 1.5–8.4)
NEUTROPHILS NFR BLD AUTO: 67.9 % (ref 40–75)
PLATELET # BLD AUTO: 113 10X3/UL (ref 150–450)
POTASSIUM SERPL-SCNC: 4.3 MMOL/L (ref 3.5–5.1)
RBC # BLD AUTO: 5.78 10X6/UL (ref 4.32–5.72)
SODIUM SERPL-SCNC: 131 MMOL/L (ref 136–145)
WBC # BLD AUTO: 5.1 10X3/UL (ref 3.5–10.5)

## 2021-03-29 PROCEDURE — 80053 COMPREHEN METABOLIC PANEL: CPT

## 2021-03-29 PROCEDURE — 96374 THER/PROPH/DIAG INJ IV PUSH: CPT

## 2021-03-29 PROCEDURE — 85025 COMPLETE CBC W/AUTO DIFF WBC: CPT

## 2021-03-29 PROCEDURE — 36416 COLLJ CAPILLARY BLOOD SPEC: CPT

## 2021-04-01 ENCOUNTER — HOSPITAL ENCOUNTER (INPATIENT)
Dept: HOSPITAL 92 - ERS | Age: 31
LOS: 4 days | Discharge: HOME | DRG: 987 | End: 2021-04-05
Attending: FAMILY MEDICINE | Admitting: FAMILY MEDICINE
Payer: SELF-PAY

## 2021-04-01 VITALS — BODY MASS INDEX: 34.7 KG/M2

## 2021-04-01 DIAGNOSIS — I26.99: ICD-10-CM

## 2021-04-01 DIAGNOSIS — B20: ICD-10-CM

## 2021-04-01 DIAGNOSIS — E10.65: ICD-10-CM

## 2021-04-01 DIAGNOSIS — Z20.822: ICD-10-CM

## 2021-04-01 DIAGNOSIS — E11.628: Primary | ICD-10-CM

## 2021-04-01 DIAGNOSIS — I10: ICD-10-CM

## 2021-04-01 DIAGNOSIS — L03.112: ICD-10-CM

## 2021-04-01 DIAGNOSIS — D50.9: ICD-10-CM

## 2021-04-01 DIAGNOSIS — R16.1: ICD-10-CM

## 2021-04-01 DIAGNOSIS — L02.412: ICD-10-CM

## 2021-04-01 DIAGNOSIS — E78.89: ICD-10-CM

## 2021-04-01 LAB
ALBUMIN SERPL BCG-MCNC: 4.2 G/DL (ref 3.5–5)
ALP SERPL-CCNC: 113 U/L (ref 40–110)
ALT SERPL W P-5'-P-CCNC: 23 U/L (ref 8–55)
ANALYZER IN CARDIO: (no result)
ANION GAP SERPL CALC-SCNC: 17 MMOL/L (ref 10–20)
AST SERPL-CCNC: 72 U/L (ref 5–34)
BASE EXCESS STD BLDV CALC-SCNC: -0.2 MEQ/L
BASOPHILS # BLD AUTO: 0 THOU/UL (ref 0–0.2)
BASOPHILS NFR BLD AUTO: 0 % (ref 0–1)
BILIRUB SERPL-MCNC: 0.7 MG/DL (ref 0.2–1.2)
BUN SERPL-MCNC: 11 MG/DL (ref 8.9–20.6)
CA-I BLDV-MCNC: 1.17 MMOL/L (ref 1.16–1.32)
CALCIUM SERPL-MCNC: 9.1 MG/DL (ref 7.8–10.44)
CHLORIDE BLDV-SCNC: 99 MMOL/L (ref 98–106)
CHLORIDE SERPL-SCNC: 96 MMOL/L (ref 98–107)
CO2 SERPL-SCNC: 21 MMOL/L (ref 22–29)
CREAT CL PREDICTED SERPL C-G-VRATE: 0 ML/MIN (ref 70–130)
DRUG SCREEN CUTOFF: (no result)
EOSINOPHIL # BLD AUTO: 0 THOU/UL (ref 0–0.7)
EOSINOPHIL NFR BLD AUTO: 0.7 % (ref 0–10)
GLOBULIN SER CALC-MCNC: 5.2 G/DL (ref 2.4–3.5)
GLUCOSE SERPL-MCNC: 528 MG/DL (ref 70–105)
HCO3 BLDV-SCNC: 27 MEQ/L (ref 22–28)
HCT VFR BLDV CALC: 44 % (ref 42–52)
HGB BLD-MCNC: 17.2 G/DL (ref 14–18)
HGB BLDV-MCNC: 14.9 G/DL (ref 13.2–17.3)
LYMPHOCYTES # BLD: 1.4 THOU/UL (ref 1.2–3.4)
LYMPHOCYTES NFR BLD AUTO: 24.9 % (ref 21–51)
MCH RBC QN AUTO: 27.3 PG (ref 27–31)
MCV RBC AUTO: 76.7 FL (ref 78–98)
MDIFF COMPLETE?: YES
MEDTOX CONTROL LINE VALID?: (no result)
MEDTOX READER #: (no result)
MICROCYTES BLD QL SMEAR: (no result) (100X)
MONOCYTES # BLD AUTO: 0.3 THOU/UL (ref 0.11–0.59)
MONOCYTES NFR BLD AUTO: 5.3 % (ref 0–10)
NEUTROPHILS # BLD AUTO: 3.9 THOU/UL (ref 1.4–6.5)
NEUTROPHILS NFR BLD AUTO: 69.1 % (ref 42–75)
PLATELET # BLD AUTO: 121 THOU/UL (ref 130–400)
POTASSIUM BLDV-SCNC: 4.91 MMOL/L (ref 3.7–5.3)
POTASSIUM SERPL-SCNC: 4.9 MMOL/L (ref 3.5–5.1)
PROT UR STRIP.AUTO-MCNC: 10 MG/DL
RBC # BLD AUTO: 6.31 MILL/UL (ref 4.7–6.1)
RBC UR QL AUTO: (no result) HPF (ref 0–3)
SODIUM BLDV-SCNC: 133.3 MMOL/L (ref 133–146)
SODIUM SERPL-SCNC: 129 MMOL/L (ref 136–145)
SP GR UR STRIP: 1.03 (ref 1–1.04)
WBC # BLD AUTO: 5.7 THOU/UL (ref 4.8–10.8)
WBC UR QL AUTO: (no result) HPF (ref 0–3)

## 2021-04-01 PROCEDURE — 80202 ASSAY OF VANCOMYCIN: CPT

## 2021-04-01 PROCEDURE — 87070 CULTURE OTHR SPECIMN AEROBIC: CPT

## 2021-04-01 PROCEDURE — 83550 IRON BINDING TEST: CPT

## 2021-04-01 PROCEDURE — 0XJ53ZZ INSPECTION OF LEFT AXILLA, PERCUTANEOUS APPROACH: ICD-10-PCS | Performed by: FAMILY MEDICINE

## 2021-04-01 PROCEDURE — 81003 URINALYSIS AUTO W/O SCOPE: CPT

## 2021-04-01 PROCEDURE — 83540 ASSAY OF IRON: CPT

## 2021-04-01 PROCEDURE — 87205 SMEAR GRAM STAIN: CPT

## 2021-04-01 PROCEDURE — 82010 KETONE BODYS QUAN: CPT

## 2021-04-01 PROCEDURE — 93005 ELECTROCARDIOGRAM TRACING: CPT

## 2021-04-01 PROCEDURE — 96375 TX/PRO/DX INJ NEW DRUG ADDON: CPT

## 2021-04-01 PROCEDURE — 85048 AUTOMATED LEUKOCYTE COUNT: CPT

## 2021-04-01 PROCEDURE — 84484 ASSAY OF TROPONIN QUANT: CPT

## 2021-04-01 PROCEDURE — 81015 MICROSCOPIC EXAM OF URINE: CPT

## 2021-04-01 PROCEDURE — 96376 TX/PRO/DX INJ SAME DRUG ADON: CPT

## 2021-04-01 PROCEDURE — 83036 HEMOGLOBIN GLYCOSYLATED A1C: CPT

## 2021-04-01 PROCEDURE — 87040 BLOOD CULTURE FOR BACTERIA: CPT

## 2021-04-01 PROCEDURE — 82805 BLOOD GASES W/O2 SATURATION: CPT

## 2021-04-01 PROCEDURE — 87186 SC STD MICRODIL/AGAR DIL: CPT

## 2021-04-01 PROCEDURE — 80048 BASIC METABOLIC PNL TOTAL CA: CPT

## 2021-04-01 PROCEDURE — 93010 ELECTROCARDIOGRAM REPORT: CPT

## 2021-04-01 PROCEDURE — 71045 X-RAY EXAM CHEST 1 VIEW: CPT

## 2021-04-01 PROCEDURE — 36416 COLLJ CAPILLARY BLOOD SPEC: CPT

## 2021-04-01 PROCEDURE — 87086 URINE CULTURE/COLONY COUNT: CPT

## 2021-04-01 PROCEDURE — 87635 SARS-COV-2 COVID-19 AMP PRB: CPT

## 2021-04-01 PROCEDURE — 36415 COLL VENOUS BLD VENIPUNCTURE: CPT

## 2021-04-01 PROCEDURE — 85025 COMPLETE CBC W/AUTO DIFF WBC: CPT

## 2021-04-01 PROCEDURE — U0005 INFEC AGEN DETEC AMPLI PROBE: HCPCS

## 2021-04-01 PROCEDURE — 96365 THER/PROPH/DIAG IV INF INIT: CPT

## 2021-04-01 PROCEDURE — 80053 COMPREHEN METABOLIC PANEL: CPT

## 2021-04-01 PROCEDURE — 87536 HIV-1 QUANT&REVRSE TRNSCRPJ: CPT

## 2021-04-01 PROCEDURE — 82728 ASSAY OF FERRITIN: CPT

## 2021-04-01 PROCEDURE — U0003 INFECTIOUS AGENT DETECTION BY NUCLEIC ACID (DNA OR RNA); SEVERE ACUTE RESPIRATORY SYNDROME CORONAVIRUS 2 (SARS-COV-2) (CORONAVIRUS DISEASE [COVID-19]), AMPLIFIED PROBE TECHNIQUE, MAKING USE OF HIGH THROUGHPUT TECHNOLOGIES AS DESCRIBED BY CMS-2020-01-R: HCPCS

## 2021-04-01 PROCEDURE — 84145 PROCALCITONIN (PCT): CPT

## 2021-04-01 PROCEDURE — 87077 CULTURE AEROBIC IDENTIFY: CPT

## 2021-04-01 PROCEDURE — 71260 CT THORAX DX C+: CPT

## 2021-04-01 PROCEDURE — 80306 DRUG TEST PRSMV INSTRMNT: CPT

## 2021-04-01 PROCEDURE — 86361 T CELL ABSOLUTE COUNT: CPT

## 2021-04-01 PROCEDURE — 83605 ASSAY OF LACTIC ACID: CPT

## 2021-04-01 RX ADMIN — Medication SCH ML: at 22:32

## 2021-04-01 RX ADMIN — INSULIN LISPRO PRN UNIT: 100 INJECTION, SOLUTION INTRAVENOUS; SUBCUTANEOUS at 22:19

## 2021-04-02 LAB
ANION GAP SERPL CALC-SCNC: 14 MMOL/L (ref 10–20)
BASOPHILS # BLD AUTO: 0 THOU/UL (ref 0–0.2)
BASOPHILS NFR BLD AUTO: 0.2 % (ref 0–1)
BUN SERPL-MCNC: 11 MG/DL (ref 8.9–20.6)
CALCIUM SERPL-MCNC: 8.5 MG/DL (ref 7.8–10.44)
CHLORIDE SERPL-SCNC: 101 MMOL/L (ref 98–107)
CO2 SERPL-SCNC: 18 MMOL/L (ref 22–29)
CREAT CL PREDICTED SERPL C-G-VRATE: 221 ML/MIN (ref 70–130)
EOSINOPHIL # BLD AUTO: 0.1 THOU/UL (ref 0–0.7)
EOSINOPHIL NFR BLD AUTO: 1.2 % (ref 0–10)
GLUCOSE SERPL-MCNC: 310 MG/DL (ref 70–105)
HGB BLD-MCNC: 15.1 G/DL (ref 14–18)
LYMPHOCYTES # BLD: 1.4 THOU/UL (ref 1.2–3.4)
LYMPHOCYTES NFR BLD AUTO: 23.4 % (ref 21–51)
MCH RBC QN AUTO: 28.3 PG (ref 27–31)
MCV RBC AUTO: 76.3 FL (ref 78–98)
MONOCYTES # BLD AUTO: 0.3 THOU/UL (ref 0.11–0.59)
MONOCYTES NFR BLD AUTO: 5.6 % (ref 0–10)
NEUTROPHILS # BLD AUTO: 4.1 THOU/UL (ref 1.4–6.5)
NEUTROPHILS NFR BLD AUTO: 69.6 % (ref 42–75)
PLATELET # BLD AUTO: 101 THOU/UL (ref 130–400)
POTASSIUM SERPL-SCNC: 4.3 MMOL/L (ref 3.5–5.1)
RBC # BLD AUTO: 5.34 MILL/UL (ref 4.7–6.1)
SODIUM SERPL-SCNC: 129 MMOL/L (ref 136–145)
VANCOMYCIN TROUGH SERPL-MCNC: 10.9 UG/ML
WBC # BLD AUTO: 5.9 THOU/UL (ref 4.8–10.8)

## 2021-04-02 PROCEDURE — 0J9F0ZZ DRAINAGE OF LEFT UPPER ARM SUBCUTANEOUS TISSUE AND FASCIA, OPEN APPROACH: ICD-10-PCS | Performed by: FAMILY MEDICINE

## 2021-04-02 RX ADMIN — HYDROCODONE BITARTRATE AND ACETAMINOPHEN PRN TAB: 10; 325 TABLET ORAL at 15:00

## 2021-04-02 RX ADMIN — VANCOMYCIN SCH MLS: 1.5 INJECTION, SOLUTION INTRAVENOUS at 06:36

## 2021-04-02 RX ADMIN — Medication SCH ML: at 22:08

## 2021-04-02 RX ADMIN — VANCOMYCIN SCH MLS: 1.5 INJECTION, SOLUTION INTRAVENOUS at 15:00

## 2021-04-02 RX ADMIN — VANCOMYCIN SCH MLS: 1.25 INJECTION, SOLUTION INTRAVENOUS at 22:08

## 2021-04-02 RX ADMIN — INSULIN LISPRO PRN UNIT: 100 INJECTION, SOLUTION INTRAVENOUS; SUBCUTANEOUS at 01:43

## 2021-04-02 RX ADMIN — INSULIN LISPRO PRN UNIT: 100 INJECTION, SOLUTION INTRAVENOUS; SUBCUTANEOUS at 07:01

## 2021-04-02 RX ADMIN — INSULIN HUMAN SCH: 100 INJECTION, SUSPENSION SUBCUTANEOUS at 15:11

## 2021-04-02 RX ADMIN — INSULIN LISPRO PRN UNIT: 100 INJECTION, SOLUTION INTRAVENOUS; SUBCUTANEOUS at 17:22

## 2021-04-02 RX ADMIN — Medication SCH ML: at 15:02

## 2021-04-02 RX ADMIN — INSULIN HUMAN SCH UNIT: 100 INJECTION, SUSPENSION SUBCUTANEOUS at 15:14

## 2021-04-03 LAB
ANION GAP SERPL CALC-SCNC: 20 MMOL/L (ref 10–20)
BASOPHILS # BLD AUTO: 0 THOU/UL (ref 0–0.2)
BASOPHILS NFR BLD AUTO: 0.6 % (ref 0–1)
BUN SERPL-MCNC: 8 MG/DL (ref 8.9–20.6)
CALCIUM SERPL-MCNC: 8.6 MG/DL (ref 7.8–10.44)
CD3+CD4+ CELLS # BLD: 397 /UL (ref 359–1519)
CD3+CD4+ CELLS NFR BLD: 36.1 % (ref 30.8–58.5)
CHLORIDE SERPL-SCNC: 99 MMOL/L (ref 98–107)
CO2 SERPL-SCNC: 17 MMOL/L (ref 22–29)
CREAT CL PREDICTED SERPL C-G-VRATE: 196 ML/MIN (ref 70–130)
EOSINOPHIL # BLD AUTO: 0.1 THOU/UL (ref 0–0.7)
EOSINOPHIL NFR BLD AUTO: 1.9 % (ref 0–10)
GLUCOSE SERPL-MCNC: 366 MG/DL (ref 70–105)
HGB BLD-MCNC: 14.8 G/DL (ref 14–18)
IRON SERPL-MCNC: 77 UG/DL (ref 65–175)
LYMPHOCYTES # BLD AUTO: 1.1 X10E3/UL (ref 0.7–3.1)
LYMPHOCYTES # BLD: 1.4 THOU/UL (ref 1.2–3.4)
LYMPHOCYTES NFR BLD AUTO: 22 %
LYMPHOCYTES NFR BLD AUTO: 28.3 % (ref 21–51)
MCH RBC QN AUTO: 28.2 PG (ref 27–31)
MCV RBC AUTO: 76.5 FL (ref 78–98)
MONOCYTES # BLD AUTO: 0.3 THOU/UL (ref 0.11–0.59)
MONOCYTES NFR BLD AUTO: 5.7 % (ref 0–10)
NEUTROPHILS # BLD AUTO: 3.1 THOU/UL (ref 1.4–6.5)
NEUTROPHILS NFR BLD AUTO: 63.5 % (ref 42–75)
NRBC BLD AUTO-RTO: (no result) %
PLATELET # BLD AUTO: 114 THOU/UL (ref 130–400)
POTASSIUM SERPL-SCNC: 4.6 MMOL/L (ref 3.5–5.1)
RBC # BLD AUTO: 5.26 MILL/UL (ref 4.7–6.1)
SODIUM SERPL-SCNC: 131 MMOL/L (ref 136–145)
UIBC SERPL-MCNC: 263 MCG/DL (ref 261–462)
VANCOMYCIN TROUGH SERPL-MCNC: 8.2 UG/ML
WBC # BLD AUTO: 4.9 X10E3/UL (ref 3.4–10.8)
WBC # BLD AUTO: 5 THOU/UL (ref 4.8–10.8)

## 2021-04-03 RX ADMIN — Medication SCH ML: at 09:43

## 2021-04-03 RX ADMIN — INSULIN LISPRO PRN UNIT: 100 INJECTION, SOLUTION INTRAVENOUS; SUBCUTANEOUS at 12:14

## 2021-04-03 RX ADMIN — Medication SCH ML: at 20:28

## 2021-04-03 RX ADMIN — VANCOMYCIN SCH MLS: 2 INJECTION, SOLUTION INTRAVENOUS at 22:30

## 2021-04-03 RX ADMIN — INSULIN LISPRO PRN UNIT: 100 INJECTION, SOLUTION INTRAVENOUS; SUBCUTANEOUS at 16:34

## 2021-04-03 RX ADMIN — HYDROCODONE BITARTRATE AND ACETAMINOPHEN PRN TAB: 10; 325 TABLET ORAL at 16:33

## 2021-04-03 RX ADMIN — HYDROCODONE BITARTRATE AND ACETAMINOPHEN PRN TAB: 10; 325 TABLET ORAL at 00:49

## 2021-04-03 RX ADMIN — INSULIN LISPRO PRN UNIT: 100 INJECTION, SOLUTION INTRAVENOUS; SUBCUTANEOUS at 05:38

## 2021-04-03 RX ADMIN — VANCOMYCIN SCH MLS: 1.25 INJECTION, SOLUTION INTRAVENOUS at 05:29

## 2021-04-03 RX ADMIN — INSULIN HUMAN SCH UNIT: 100 INJECTION, SUSPENSION SUBCUTANEOUS at 09:43

## 2021-04-03 RX ADMIN — HYDROCODONE BITARTRATE AND ACETAMINOPHEN PRN TAB: 10; 325 TABLET ORAL at 22:16

## 2021-04-03 RX ADMIN — VANCOMYCIN SCH: 1.25 INJECTION, SOLUTION INTRAVENOUS at 23:04

## 2021-04-03 RX ADMIN — INSULIN HUMAN SCH UNIT: 100 INJECTION, SUSPENSION SUBCUTANEOUS at 16:35

## 2021-04-03 RX ADMIN — VANCOMYCIN SCH MLS: 1.25 INJECTION, SOLUTION INTRAVENOUS at 13:26

## 2021-04-03 RX ADMIN — INSULIN HUMAN SCH: 100 INJECTION, SUSPENSION SUBCUTANEOUS at 08:36

## 2021-04-04 LAB
ANION GAP SERPL CALC-SCNC: 15 MMOL/L (ref 10–20)
BASOPHILS # BLD AUTO: 0 THOU/UL (ref 0–0.2)
BASOPHILS NFR BLD AUTO: 0.4 % (ref 0–1)
BUN SERPL-MCNC: 7 MG/DL (ref 8.9–20.6)
CALCIUM SERPL-MCNC: 8.5 MG/DL (ref 7.8–10.44)
CHLORIDE SERPL-SCNC: 100 MMOL/L (ref 98–107)
CO2 SERPL-SCNC: 24 MMOL/L (ref 22–29)
CREAT CL PREDICTED SERPL C-G-VRATE: 243 ML/MIN (ref 70–130)
EOSINOPHIL # BLD AUTO: 0.1 THOU/UL (ref 0–0.7)
EOSINOPHIL NFR BLD AUTO: 1.4 % (ref 0–10)
GLUCOSE SERPL-MCNC: 341 MG/DL (ref 70–105)
HGB BLD-MCNC: 14.2 G/DL (ref 14–18)
LYMPHOCYTES # BLD: 1.4 THOU/UL (ref 1.2–3.4)
LYMPHOCYTES NFR BLD AUTO: 34.6 % (ref 21–51)
MCH RBC QN AUTO: 27.9 PG (ref 27–31)
MCV RBC AUTO: 77 FL (ref 78–98)
MONOCYTES # BLD AUTO: 0.2 THOU/UL (ref 0.11–0.59)
MONOCYTES NFR BLD AUTO: 5.6 % (ref 0–10)
NEUTROPHILS # BLD AUTO: 2.3 THOU/UL (ref 1.4–6.5)
NEUTROPHILS NFR BLD AUTO: 58 % (ref 42–75)
PLATELET # BLD AUTO: 108 THOU/UL (ref 130–400)
POTASSIUM SERPL-SCNC: 3.8 MMOL/L (ref 3.5–5.1)
RBC # BLD AUTO: 5.09 MILL/UL (ref 4.7–6.1)
SODIUM SERPL-SCNC: 135 MMOL/L (ref 136–145)
VANCOMYCIN TROUGH SERPL-MCNC: 38.3 UG/ML
WBC # BLD AUTO: 3.9 THOU/UL (ref 4.8–10.8)

## 2021-04-04 RX ADMIN — INSULIN LISPRO PRN UNIT: 100 INJECTION, SOLUTION INTRAVENOUS; SUBCUTANEOUS at 16:12

## 2021-04-04 RX ADMIN — HYDROCODONE BITARTRATE AND ACETAMINOPHEN PRN TAB: 10; 325 TABLET ORAL at 14:29

## 2021-04-04 RX ADMIN — INSULIN LISPRO PRN UNIT: 100 INJECTION, SOLUTION INTRAVENOUS; SUBCUTANEOUS at 05:30

## 2021-04-04 RX ADMIN — INSULIN HUMAN SCH UNIT: 100 INJECTION, SUSPENSION SUBCUTANEOUS at 17:51

## 2021-04-04 RX ADMIN — VANCOMYCIN SCH MLS: 2 INJECTION, SOLUTION INTRAVENOUS at 03:51

## 2021-04-04 RX ADMIN — Medication SCH ML: at 20:47

## 2021-04-04 RX ADMIN — Medication SCH ML: at 08:10

## 2021-04-04 RX ADMIN — HYDROCODONE BITARTRATE AND ACETAMINOPHEN PRN TAB: 10; 325 TABLET ORAL at 03:14

## 2021-04-04 RX ADMIN — HYDROCODONE BITARTRATE AND ACETAMINOPHEN PRN TAB: 10; 325 TABLET ORAL at 10:02

## 2021-04-04 RX ADMIN — INSULIN LISPRO PRN UNIT: 100 INJECTION, SOLUTION INTRAVENOUS; SUBCUTANEOUS at 12:02

## 2021-04-04 RX ADMIN — INSULIN HUMAN SCH UNIT: 100 INJECTION, SUSPENSION SUBCUTANEOUS at 12:01

## 2021-04-04 RX ADMIN — INSULIN HUMAN SCH UNIT: 100 INJECTION, SUSPENSION SUBCUTANEOUS at 08:10

## 2021-04-04 RX ADMIN — VANCOMYCIN SCH MLS: 2 INJECTION, SOLUTION INTRAVENOUS at 10:38

## 2021-04-05 VITALS — DIASTOLIC BLOOD PRESSURE: 86 MMHG | SYSTOLIC BLOOD PRESSURE: 124 MMHG | TEMPERATURE: 97.8 F

## 2021-04-05 LAB
ANION GAP SERPL CALC-SCNC: 16 MMOL/L (ref 10–20)
BASOPHILS # BLD AUTO: 0 THOU/UL (ref 0–0.2)
BASOPHILS NFR BLD AUTO: 0.5 % (ref 0–1)
BUN SERPL-MCNC: 8 MG/DL (ref 8.9–20.6)
CALCIUM SERPL-MCNC: 8.8 MG/DL (ref 7.8–10.44)
CHLORIDE SERPL-SCNC: 103 MMOL/L (ref 98–107)
CO2 SERPL-SCNC: 22 MMOL/L (ref 22–29)
CREAT CL PREDICTED SERPL C-G-VRATE: 256 ML/MIN (ref 70–130)
EOSINOPHIL # BLD AUTO: 0.1 THOU/UL (ref 0–0.7)
EOSINOPHIL NFR BLD AUTO: 4 % (ref 0–10)
GLUCOSE SERPL-MCNC: 239 MG/DL (ref 70–105)
HGB BLD-MCNC: 13.8 G/DL (ref 14–18)
LYMPHOCYTES # BLD: 1.1 THOU/UL (ref 1.2–3.4)
LYMPHOCYTES NFR BLD AUTO: 31.2 % (ref 21–51)
MCH RBC QN AUTO: 27.3 PG (ref 27–31)
MCV RBC AUTO: 77.4 FL (ref 78–98)
MONOCYTES # BLD AUTO: 0.2 THOU/UL (ref 0.11–0.59)
MONOCYTES NFR BLD AUTO: 6 % (ref 0–10)
NEUTROPHILS # BLD AUTO: 2.1 THOU/UL (ref 1.4–6.5)
NEUTROPHILS NFR BLD AUTO: 58.3 % (ref 42–75)
PLATELET # BLD AUTO: 102 THOU/UL (ref 130–400)
POTASSIUM SERPL-SCNC: 4.1 MMOL/L (ref 3.5–5.1)
RBC # BLD AUTO: 5.05 MILL/UL (ref 4.7–6.1)
SODIUM SERPL-SCNC: 137 MMOL/L (ref 136–145)
WBC # BLD AUTO: 3.5 THOU/UL (ref 4.8–10.8)

## 2021-04-05 RX ADMIN — INSULIN LISPRO PRN UNIT: 100 INJECTION, SOLUTION INTRAVENOUS; SUBCUTANEOUS at 08:16

## 2021-04-05 RX ADMIN — HYDROCODONE BITARTRATE AND ACETAMINOPHEN PRN TAB: 10; 325 TABLET ORAL at 08:21

## 2021-04-05 RX ADMIN — Medication SCH ML: at 08:18

## 2021-04-05 RX ADMIN — HYDROCODONE BITARTRATE AND ACETAMINOPHEN PRN TAB: 10; 325 TABLET ORAL at 01:09

## 2021-04-05 RX ADMIN — INSULIN LISPRO PRN UNIT: 100 INJECTION, SOLUTION INTRAVENOUS; SUBCUTANEOUS at 11:54

## 2021-04-05 RX ADMIN — INSULIN HUMAN SCH UNIT: 100 INJECTION, SUSPENSION SUBCUTANEOUS at 08:14

## 2021-04-06 LAB
HIV1 RNA # SERPL NAA+PROBE: <20 COPIES/ML
HIV1 RNA SERPL NAA+PROBE-LOG#: (no result) {LOG_COPIES}/ML

## 2021-04-26 ENCOUNTER — HOSPITAL ENCOUNTER (EMERGENCY)
Dept: HOSPITAL 92 - ERS | Age: 31
LOS: 1 days | Discharge: ANOTHER HOSPITAL | End: 2021-04-27
Payer: COMMERCIAL

## 2021-04-26 DIAGNOSIS — E10.9: ICD-10-CM

## 2021-04-26 DIAGNOSIS — F17.220: ICD-10-CM

## 2021-04-26 DIAGNOSIS — K61.1: Primary | ICD-10-CM

## 2021-04-26 DIAGNOSIS — Z21: ICD-10-CM

## 2021-04-26 DIAGNOSIS — Z20.822: ICD-10-CM

## 2021-04-26 DIAGNOSIS — E66.9: ICD-10-CM

## 2021-04-26 DIAGNOSIS — Z79.899: ICD-10-CM

## 2021-04-26 LAB
ALBUMIN SERPL BCG-MCNC: 4.2 G/DL (ref 3.5–5)
ALP SERPL-CCNC: 113 U/L (ref 40–110)
BASOPHILS # BLD AUTO: 0 THOU/UL (ref 0–0.2)
BASOPHILS NFR BLD AUTO: 0.1 % (ref 0–1)
CREAT CL PREDICTED SERPL C-G-VRATE: 0 ML/MIN (ref 70–130)
EOSINOPHIL # BLD AUTO: 0 THOU/UL (ref 0–0.7)
EOSINOPHIL NFR BLD AUTO: 1.2 % (ref 0–10)
HGB BLD-MCNC: 15.8 G/DL (ref 14–18)
LYMPHOCYTES # BLD: 1.2 THOU/UL (ref 1.2–3.4)
LYMPHOCYTES NFR BLD AUTO: 28 % (ref 21–51)
MCH RBC QN AUTO: 27.5 PG (ref 27–31)
MCV RBC AUTO: 76.1 FL (ref 78–98)
MONOCYTES # BLD AUTO: 0.2 THOU/UL (ref 0.11–0.59)
MONOCYTES NFR BLD AUTO: 5.3 % (ref 0–10)
NEUTROPHILS # BLD AUTO: 2.7 THOU/UL (ref 1.4–6.5)
NEUTROPHILS NFR BLD AUTO: 65.5 % (ref 42–75)
PLATELET # BLD AUTO: 122 THOU/UL (ref 130–400)
RBC # BLD AUTO: 5.74 MILL/UL (ref 4.7–6.1)
WBC # BLD AUTO: 4.1 THOU/UL (ref 4.8–10.8)

## 2021-04-26 PROCEDURE — 83735 ASSAY OF MAGNESIUM: CPT

## 2021-04-26 PROCEDURE — 87635 SARS-COV-2 COVID-19 AMP PRB: CPT

## 2021-04-26 PROCEDURE — 72192 CT PELVIS W/O DYE: CPT

## 2021-04-26 PROCEDURE — 83690 ASSAY OF LIPASE: CPT

## 2021-04-26 PROCEDURE — 74177 CT ABD & PELVIS W/CONTRAST: CPT

## 2021-04-26 PROCEDURE — 96365 THER/PROPH/DIAG IV INF INIT: CPT

## 2021-04-26 PROCEDURE — 96367 TX/PROPH/DG ADDL SEQ IV INF: CPT

## 2021-04-26 PROCEDURE — U0003 INFECTIOUS AGENT DETECTION BY NUCLEIC ACID (DNA OR RNA); SEVERE ACUTE RESPIRATORY SYNDROME CORONAVIRUS 2 (SARS-COV-2) (CORONAVIRUS DISEASE [COVID-19]), AMPLIFIED PROBE TECHNIQUE, MAKING USE OF HIGH THROUGHPUT TECHNOLOGIES AS DESCRIBED BY CMS-2020-01-R: HCPCS

## 2021-04-26 PROCEDURE — 36415 COLL VENOUS BLD VENIPUNCTURE: CPT

## 2021-04-26 PROCEDURE — U0005 INFEC AGEN DETEC AMPLI PROBE: HCPCS

## 2021-04-26 PROCEDURE — 87040 BLOOD CULTURE FOR BACTERIA: CPT

## 2021-04-26 PROCEDURE — 83605 ASSAY OF LACTIC ACID: CPT

## 2021-04-26 PROCEDURE — 36416 COLLJ CAPILLARY BLOOD SPEC: CPT

## 2021-04-26 PROCEDURE — 96375 TX/PRO/DX INJ NEW DRUG ADDON: CPT

## 2021-04-26 PROCEDURE — 81003 URINALYSIS AUTO W/O SCOPE: CPT

## 2021-04-26 PROCEDURE — 80053 COMPREHEN METABOLIC PANEL: CPT

## 2021-04-26 PROCEDURE — 85025 COMPLETE CBC W/AUTO DIFF WBC: CPT

## 2021-04-26 PROCEDURE — S0020 INJECTION, BUPIVICAINE HYDRO: HCPCS

## 2021-04-26 PROCEDURE — 82010 KETONE BODYS QUAN: CPT

## 2021-04-27 VITALS — TEMPERATURE: 97.9 F | SYSTOLIC BLOOD PRESSURE: 116 MMHG | DIASTOLIC BLOOD PRESSURE: 60 MMHG

## 2021-04-27 LAB
ALT SERPL W P-5'-P-CCNC: 34 U/L (ref 8–55)
ANION GAP SERPL CALC-SCNC: 20 MMOL/L (ref 10–20)
AST SERPL-CCNC: 25 U/L (ref 5–34)
BILIRUB SERPL-MCNC: 0.5 MG/DL (ref 0.2–1.2)
BUN SERPL-MCNC: 22 MG/DL (ref 8.9–20.6)
CALCIUM SERPL-MCNC: 9.4 MG/DL (ref 7.8–10.44)
CHLORIDE SERPL-SCNC: 102 MMOL/L (ref 98–107)
CO2 SERPL-SCNC: 22 MMOL/L (ref 22–29)
GLOBULIN SER CALC-MCNC: 3 G/DL (ref 2.4–3.5)
GLUCOSE SERPL-MCNC: 570 MG/DL (ref 70–105)
LIPASE SERPL-CCNC: 42 U/L (ref 8–78)
MAGNESIUM SERPL-MCNC: 1.8 MG/DL (ref 1.6–2.6)
POTASSIUM SERPL-SCNC: 5.3 MMOL/L (ref 3.5–5.1)
SODIUM SERPL-SCNC: 139 MMOL/L (ref 136–145)
SP GR UR STRIP: 1.03 (ref 1–1.04)

## 2021-04-30 ENCOUNTER — HOSPITAL ENCOUNTER (EMERGENCY)
Dept: HOSPITAL 92 - ERS | Age: 31
Discharge: HOME | End: 2021-04-30
Payer: SELF-PAY

## 2021-04-30 DIAGNOSIS — Z79.4: ICD-10-CM

## 2021-04-30 DIAGNOSIS — E66.9: ICD-10-CM

## 2021-04-30 DIAGNOSIS — K62.89: Primary | ICD-10-CM

## 2021-04-30 DIAGNOSIS — E10.9: ICD-10-CM

## 2021-04-30 DIAGNOSIS — Z71.7: ICD-10-CM

## 2021-04-30 DIAGNOSIS — F17.220: ICD-10-CM

## 2021-04-30 LAB
ALBUMIN SERPL BCG-MCNC: 3.8 G/DL (ref 3.5–5)
ALP SERPL-CCNC: 84 U/L (ref 40–110)
ALT SERPL W P-5'-P-CCNC: 26 U/L (ref 8–55)
ANION GAP SERPL CALC-SCNC: 12 MMOL/L (ref 10–20)
AST SERPL-CCNC: 16 U/L (ref 5–34)
BASOPHILS # BLD AUTO: 0 THOU/UL (ref 0–0.2)
BASOPHILS NFR BLD AUTO: 0.2 % (ref 0–1)
BILIRUB SERPL-MCNC: 0.7 MG/DL (ref 0.2–1.2)
BUN SERPL-MCNC: 12 MG/DL (ref 8.9–20.6)
CALCIUM SERPL-MCNC: 8.9 MG/DL (ref 7.8–10.44)
CHLORIDE SERPL-SCNC: 98 MMOL/L (ref 98–107)
CO2 SERPL-SCNC: 29 MMOL/L (ref 22–29)
CREAT CL PREDICTED SERPL C-G-VRATE: 0 ML/MIN (ref 70–130)
EOSINOPHIL # BLD AUTO: 0.1 THOU/UL (ref 0–0.7)
EOSINOPHIL NFR BLD AUTO: 1.5 % (ref 0–10)
GLOBULIN SER CALC-MCNC: 2.9 G/DL (ref 2.4–3.5)
GLUCOSE SERPL-MCNC: 358 MG/DL (ref 70–105)
HGB BLD-MCNC: 13.9 G/DL (ref 14–18)
LYMPHOCYTES # BLD: 0.9 THOU/UL (ref 1.2–3.4)
LYMPHOCYTES NFR BLD AUTO: 22.7 % (ref 21–51)
MCH RBC QN AUTO: 26.1 PG (ref 27–31)
MCV RBC AUTO: 77.1 FL (ref 78–98)
MONOCYTES # BLD AUTO: 0.2 THOU/UL (ref 0.11–0.59)
MONOCYTES NFR BLD AUTO: 6.4 % (ref 0–10)
NEUTROPHILS # BLD AUTO: 2.6 THOU/UL (ref 1.4–6.5)
NEUTROPHILS NFR BLD AUTO: 69.1 % (ref 42–75)
PLATELET # BLD AUTO: 95 THOU/UL (ref 130–400)
POTASSIUM SERPL-SCNC: 4 MMOL/L (ref 3.5–5.1)
RBC # BLD AUTO: 5.32 MILL/UL (ref 4.7–6.1)
SODIUM SERPL-SCNC: 135 MMOL/L (ref 136–145)
WBC # BLD AUTO: 3.7 THOU/UL (ref 4.8–10.8)

## 2021-04-30 PROCEDURE — 96374 THER/PROPH/DIAG INJ IV PUSH: CPT

## 2021-04-30 PROCEDURE — 72193 CT PELVIS W/DYE: CPT

## 2021-04-30 PROCEDURE — 85025 COMPLETE CBC W/AUTO DIFF WBC: CPT

## 2021-04-30 PROCEDURE — 80053 COMPREHEN METABOLIC PANEL: CPT

## 2021-04-30 PROCEDURE — 36415 COLL VENOUS BLD VENIPUNCTURE: CPT

## 2021-04-30 PROCEDURE — 82010 KETONE BODYS QUAN: CPT

## 2021-04-30 PROCEDURE — 96375 TX/PRO/DX INJ NEW DRUG ADDON: CPT

## 2021-05-08 ENCOUNTER — HOSPITAL ENCOUNTER (EMERGENCY)
Dept: HOSPITAL 92 - ERS | Age: 31
Discharge: HOME | End: 2021-05-08
Payer: COMMERCIAL

## 2021-05-08 DIAGNOSIS — Z79.899: ICD-10-CM

## 2021-05-08 DIAGNOSIS — F17.220: ICD-10-CM

## 2021-05-08 DIAGNOSIS — E10.9: ICD-10-CM

## 2021-05-08 DIAGNOSIS — B20: ICD-10-CM

## 2021-05-08 DIAGNOSIS — K61.0: Primary | ICD-10-CM

## 2021-05-08 DIAGNOSIS — E66.9: ICD-10-CM

## 2021-05-08 PROCEDURE — 99283 EMERGENCY DEPT VISIT LOW MDM: CPT

## 2021-05-09 ENCOUNTER — HOSPITAL ENCOUNTER (EMERGENCY)
Dept: HOSPITAL 92 - CSHERS | Age: 31
LOS: 1 days | Discharge: HOME | End: 2021-05-10
Payer: COMMERCIAL

## 2021-05-09 ENCOUNTER — HOSPITAL ENCOUNTER (EMERGENCY)
Dept: HOSPITAL 92 - ERS | Age: 31
Discharge: HOME | End: 2021-05-09
Payer: SELF-PAY

## 2021-05-09 DIAGNOSIS — F17.220: ICD-10-CM

## 2021-05-09 DIAGNOSIS — R10.2: ICD-10-CM

## 2021-05-09 DIAGNOSIS — G89.18: Primary | ICD-10-CM

## 2021-05-09 DIAGNOSIS — E66.9: ICD-10-CM

## 2021-05-09 DIAGNOSIS — E10.9: ICD-10-CM

## 2021-05-09 DIAGNOSIS — Z21: ICD-10-CM

## 2021-05-09 DIAGNOSIS — K62.89: ICD-10-CM

## 2021-05-09 DIAGNOSIS — Z79.899: ICD-10-CM

## 2021-05-09 PROCEDURE — 87077 CULTURE AEROBIC IDENTIFY: CPT

## 2021-05-09 PROCEDURE — 87070 CULTURE OTHR SPECIMN AEROBIC: CPT

## 2021-05-09 PROCEDURE — 87186 SC STD MICRODIL/AGAR DIL: CPT

## 2021-05-09 PROCEDURE — 96372 THER/PROPH/DIAG INJ SC/IM: CPT

## 2021-05-09 PROCEDURE — 36415 COLL VENOUS BLD VENIPUNCTURE: CPT

## 2021-05-09 PROCEDURE — 99283 EMERGENCY DEPT VISIT LOW MDM: CPT

## 2021-05-09 PROCEDURE — 85025 COMPLETE CBC W/AUTO DIFF WBC: CPT

## 2021-05-09 PROCEDURE — 87205 SMEAR GRAM STAIN: CPT

## 2021-05-10 ENCOUNTER — HOSPITAL ENCOUNTER (INPATIENT)
Dept: HOSPITAL 92 - CSHERS | Age: 31
LOS: 2 days | Discharge: HOME | DRG: 348 | End: 2021-05-12
Attending: INTERNAL MEDICINE | Admitting: INTERNAL MEDICINE
Payer: SELF-PAY

## 2021-05-10 ENCOUNTER — HOSPITAL ENCOUNTER (EMERGENCY)
Dept: HOSPITAL 92 - ERS | Age: 31
Discharge: LEFT BEFORE DISCHARGE | End: 2021-05-10
Payer: SELF-PAY

## 2021-05-10 VITALS — BODY MASS INDEX: 34.1 KG/M2

## 2021-05-10 DIAGNOSIS — Z53.21: Primary | ICD-10-CM

## 2021-05-10 DIAGNOSIS — D69.6: ICD-10-CM

## 2021-05-10 DIAGNOSIS — E10.65: ICD-10-CM

## 2021-05-10 DIAGNOSIS — N28.9: ICD-10-CM

## 2021-05-10 DIAGNOSIS — Z79.4: ICD-10-CM

## 2021-05-10 DIAGNOSIS — Z79.899: ICD-10-CM

## 2021-05-10 DIAGNOSIS — K60.3: Primary | ICD-10-CM

## 2021-05-10 DIAGNOSIS — Z20.822: ICD-10-CM

## 2021-05-10 DIAGNOSIS — B20: ICD-10-CM

## 2021-05-10 DIAGNOSIS — Z79.891: ICD-10-CM

## 2021-05-10 LAB
ALBUMIN SERPL BCG-MCNC: 4.5 G/DL (ref 3.5–5)
ALP SERPL-CCNC: 83 U/L (ref 40–110)
ALT SERPL W P-5'-P-CCNC: 30 U/L (ref 8–55)
ANION GAP SERPL CALC-SCNC: 17 MMOL/L (ref 10–20)
AST SERPL-CCNC: 23 U/L (ref 5–34)
BASOPHILS # BLD AUTO: 0 10X3/UL (ref 0–0.2)
BASOPHILS # BLD AUTO: 0 10X3/UL (ref 0–0.2)
BASOPHILS NFR BLD AUTO: 0.3 % (ref 0–2)
BASOPHILS NFR BLD AUTO: 0.4 % (ref 0–2)
BILIRUB SERPL-MCNC: 0.8 MG/DL (ref 0.2–1.2)
BUN SERPL-MCNC: 17 MG/DL (ref 8.9–20.6)
CALCIUM SERPL-MCNC: 10 MG/DL (ref 7.8–10.44)
CHLORIDE SERPL-SCNC: 101 MMOL/L (ref 98–107)
CO2 SERPL-SCNC: 26 MMOL/L (ref 22–29)
CREAT CL PREDICTED SERPL C-G-VRATE: 0 ML/MIN (ref 70–130)
EOSINOPHIL # BLD AUTO: 0 10X3/UL (ref 0–0.5)
EOSINOPHIL # BLD AUTO: 0 10X3/UL (ref 0–0.5)
EOSINOPHIL NFR BLD AUTO: 0.7 % (ref 0–6)
EOSINOPHIL NFR BLD AUTO: 0.8 % (ref 0–6)
GLOBULIN SER CALC-MCNC: 3.2 G/DL (ref 2.4–3.5)
GLUCOSE SERPL-MCNC: 284 MG/DL (ref 70–105)
HGB BLD-MCNC: 13.8 G/DL (ref 13.5–17.5)
HGB BLD-MCNC: 14.6 G/DL (ref 13.5–17.5)
LYMPHOCYTES NFR BLD AUTO: 21.7 % (ref 18–47)
LYMPHOCYTES NFR BLD AUTO: 23.4 % (ref 18–47)
MCH RBC QN AUTO: 25.8 PG (ref 27–33)
MCH RBC QN AUTO: 26.4 PG (ref 27–33)
MCV RBC AUTO: 76.1 FL (ref 81.2–95.1)
MCV RBC AUTO: 76.1 FL (ref 81.2–95.1)
MONOCYTES # BLD AUTO: 0.2 10X3/UL (ref 0–1.1)
MONOCYTES # BLD AUTO: 0.2 10X3/UL (ref 0–1.1)
MONOCYTES NFR BLD AUTO: 4.5 % (ref 0–10)
MONOCYTES NFR BLD AUTO: 4.8 % (ref 0–10)
NEUTROPHILS # BLD AUTO: 2.8 10X3/UL (ref 1.5–8.4)
NEUTROPHILS # BLD AUTO: 3.3 10X3/UL (ref 1.5–8.4)
NEUTROPHILS NFR BLD AUTO: 70.2 % (ref 40–75)
NEUTROPHILS NFR BLD AUTO: 71.5 % (ref 40–75)
PLATELET # BLD AUTO: 113 10X3/UL (ref 150–450)
PLATELET # BLD AUTO: 120 10X3/UL (ref 150–450)
POTASSIUM SERPL-SCNC: 4.8 MMOL/L (ref 3.5–5.1)
RBC # BLD AUTO: 5.22 10X6/UL (ref 4.32–5.72)
RBC # BLD AUTO: 5.66 10X6/UL (ref 4.32–5.72)
SODIUM SERPL-SCNC: 139 MMOL/L (ref 136–145)
WBC # BLD AUTO: 4 10X3/UL (ref 3.5–10.5)
WBC # BLD AUTO: 4.6 10X3/UL (ref 3.5–10.5)

## 2021-05-10 PROCEDURE — 87635 SARS-COV-2 COVID-19 AMP PRB: CPT

## 2021-05-10 PROCEDURE — 87324 CLOSTRIDIUM AG IA: CPT

## 2021-05-10 PROCEDURE — 85025 COMPLETE CBC W/AUTO DIFF WBC: CPT

## 2021-05-10 PROCEDURE — 72193 CT PELVIS W/DYE: CPT

## 2021-05-10 PROCEDURE — 87040 BLOOD CULTURE FOR BACTERIA: CPT

## 2021-05-10 PROCEDURE — 96376 TX/PRO/DX INJ SAME DRUG ADON: CPT

## 2021-05-10 PROCEDURE — 87536 HIV-1 QUANT&REVRSE TRNSCRPJ: CPT

## 2021-05-10 PROCEDURE — 88305 TISSUE EXAM BY PATHOLOGIST: CPT

## 2021-05-10 PROCEDURE — 36415 COLL VENOUS BLD VENIPUNCTURE: CPT

## 2021-05-10 PROCEDURE — 87449 NOS EACH ORGANISM AG IA: CPT

## 2021-05-10 PROCEDURE — 80048 BASIC METABOLIC PNL TOTAL CA: CPT

## 2021-05-10 PROCEDURE — 86140 C-REACTIVE PROTEIN: CPT

## 2021-05-10 PROCEDURE — U0003 INFECTIOUS AGENT DETECTION BY NUCLEIC ACID (DNA OR RNA); SEVERE ACUTE RESPIRATORY SYNDROME CORONAVIRUS 2 (SARS-COV-2) (CORONAVIRUS DISEASE [COVID-19]), AMPLIFIED PROBE TECHNIQUE, MAKING USE OF HIGH THROUGHPUT TECHNOLOGIES AS DESCRIBED BY CMS-2020-01-R: HCPCS

## 2021-05-10 PROCEDURE — U0005 INFEC AGEN DETEC AMPLI PROBE: HCPCS

## 2021-05-10 PROCEDURE — 86900 BLOOD TYPING SEROLOGIC ABO: CPT

## 2021-05-10 PROCEDURE — S0020 INJECTION, BUPIVICAINE HYDRO: HCPCS

## 2021-05-10 PROCEDURE — 86850 RBC ANTIBODY SCREEN: CPT

## 2021-05-10 PROCEDURE — 83605 ASSAY OF LACTIC ACID: CPT

## 2021-05-10 PROCEDURE — 36416 COLLJ CAPILLARY BLOOD SPEC: CPT

## 2021-05-10 PROCEDURE — 96365 THER/PROPH/DIAG IV INF INIT: CPT

## 2021-05-10 PROCEDURE — 80202 ASSAY OF VANCOMYCIN: CPT

## 2021-05-10 PROCEDURE — 83036 HEMOGLOBIN GLYCOSYLATED A1C: CPT

## 2021-05-10 PROCEDURE — 96375 TX/PRO/DX INJ NEW DRUG ADDON: CPT

## 2021-05-10 PROCEDURE — 86901 BLOOD TYPING SEROLOGIC RH(D): CPT

## 2021-05-10 PROCEDURE — 94760 N-INVAS EAR/PLS OXIMETRY 1: CPT

## 2021-05-10 PROCEDURE — 80053 COMPREHEN METABOLIC PANEL: CPT

## 2021-05-10 PROCEDURE — 84145 PROCALCITONIN (PCT): CPT

## 2021-05-10 RX ADMIN — HYDROCODONE BITARTRATE AND ACETAMINOPHEN PRN TAB: 5; 325 TABLET ORAL at 22:21

## 2021-05-10 RX ADMIN — INSULIN GLARGINE SCH UNIT: 100 INJECTION, SOLUTION SUBCUTANEOUS at 23:26

## 2021-05-11 LAB
ANION GAP SERPL CALC-SCNC: 15 MMOL/L (ref 10–20)
BASOPHILS # BLD AUTO: 0 10X3/UL (ref 0–0.2)
BASOPHILS NFR BLD AUTO: 0.3 % (ref 0–2)
BUN SERPL-MCNC: 13 MG/DL (ref 8.9–20.6)
CALCIUM SERPL-MCNC: 8.5 MG/DL (ref 7.8–10.44)
CHLORIDE SERPL-SCNC: 105 MMOL/L (ref 98–107)
CO2 SERPL-SCNC: 21 MMOL/L (ref 22–29)
CREAT CL PREDICTED SERPL C-G-VRATE: 206 ML/MIN (ref 70–130)
CRP SERPL-MCNC: (no result) MG/DL
EOSINOPHIL # BLD AUTO: 0 10X3/UL (ref 0–0.5)
EOSINOPHIL NFR BLD AUTO: 1 % (ref 0–6)
GLUCOSE SERPL-MCNC: 244 MG/DL (ref 70–105)
HGB BLD-MCNC: 12.8 G/DL (ref 13.5–17.5)
LYMPHOCYTES NFR BLD AUTO: 28.9 % (ref 18–47)
MCH RBC QN AUTO: 25.9 PG (ref 27–33)
MCV RBC AUTO: 75.5 FL (ref 81.2–95.1)
MONOCYTES # BLD AUTO: 0.3 10X3/UL (ref 0–1.1)
MONOCYTES NFR BLD AUTO: 7 % (ref 0–10)
NEUTROPHILS # BLD AUTO: 2.4 10X3/UL (ref 1.5–8.4)
NEUTROPHILS NFR BLD AUTO: 61.8 % (ref 40–75)
PLATELET # BLD AUTO: 102 10X3/UL (ref 150–450)
POTASSIUM SERPL-SCNC: 3.8 MMOL/L (ref 3.5–5.1)
RBC # BLD AUTO: 4.94 10X6/UL (ref 4.32–5.72)
SODIUM SERPL-SCNC: 137 MMOL/L (ref 136–145)
WBC # BLD AUTO: 3.8 10X3/UL (ref 3.5–10.5)

## 2021-05-11 RX ADMIN — VANCOMYCIN SCH MLS: 1.25 INJECTION, SOLUTION INTRAVENOUS at 10:31

## 2021-05-11 RX ADMIN — VANCOMYCIN SCH MLS: 1.25 INJECTION, SOLUTION INTRAVENOUS at 18:50

## 2021-05-11 RX ADMIN — HYDROCODONE BITARTRATE AND ACETAMINOPHEN PRN TAB: 5; 325 TABLET ORAL at 10:30

## 2021-05-11 RX ADMIN — HYDROCODONE BITARTRATE AND ACETAMINOPHEN PRN TAB: 5; 325 TABLET ORAL at 06:12

## 2021-05-11 RX ADMIN — HYDROCODONE BITARTRATE AND ACETAMINOPHEN PRN TAB: 5; 325 TABLET ORAL at 02:01

## 2021-05-11 RX ADMIN — HYDROCODONE BITARTRATE AND ACETAMINOPHEN PRN TAB: 5; 325 TABLET ORAL at 17:26

## 2021-05-11 RX ADMIN — INSULIN GLARGINE SCH UNIT: 100 INJECTION, SOLUTION SUBCUTANEOUS at 22:29

## 2021-05-11 RX ADMIN — HYDROCODONE BITARTRATE AND ACETAMINOPHEN PRN TAB: 5; 325 TABLET ORAL at 22:52

## 2021-05-12 VITALS — DIASTOLIC BLOOD PRESSURE: 98 MMHG | SYSTOLIC BLOOD PRESSURE: 149 MMHG

## 2021-05-12 VITALS — TEMPERATURE: 97.8 F

## 2021-05-12 LAB
ANION GAP SERPL CALC-SCNC: 15 MMOL/L (ref 10–20)
BASOPHILS # BLD AUTO: 0 10X3/UL (ref 0–0.2)
BASOPHILS NFR BLD AUTO: 0.3 % (ref 0–2)
BUN SERPL-MCNC: 10 MG/DL (ref 8.9–20.6)
CALCIUM SERPL-MCNC: 8.5 MG/DL (ref 7.8–10.44)
CHLORIDE SERPL-SCNC: 105 MMOL/L (ref 98–107)
CO2 SERPL-SCNC: 21 MMOL/L (ref 22–29)
CREAT CL PREDICTED SERPL C-G-VRATE: 201 ML/MIN (ref 70–130)
EOSINOPHIL # BLD AUTO: 0.1 10X3/UL (ref 0–0.5)
EOSINOPHIL NFR BLD AUTO: 2 % (ref 0–6)
GLUCOSE SERPL-MCNC: 256 MG/DL (ref 70–105)
HGB BLD-MCNC: 12.7 G/DL (ref 13.5–17.5)
LYMPHOCYTES NFR BLD AUTO: 32.9 % (ref 18–47)
MCH RBC QN AUTO: 26.4 PG (ref 27–33)
MCV RBC AUTO: 76.1 FL (ref 81.2–95.1)
MONOCYTES # BLD AUTO: 0.2 10X3/UL (ref 0–1.1)
MONOCYTES NFR BLD AUTO: 6.3 % (ref 0–10)
NEUTROPHILS # BLD AUTO: 2 10X3/UL (ref 1.5–8.4)
NEUTROPHILS NFR BLD AUTO: 57.6 % (ref 40–75)
PLATELET # BLD AUTO: 111 10X3/UL (ref 150–450)
POTASSIUM SERPL-SCNC: 3.7 MMOL/L (ref 3.5–5.1)
RBC # BLD AUTO: 4.81 10X6/UL (ref 4.32–5.72)
SODIUM SERPL-SCNC: 137 MMOL/L (ref 136–145)
VANCOMYCIN TROUGH SERPL-MCNC: 6 UG/ML
WBC # BLD AUTO: 3.5 10X3/UL (ref 3.5–10.5)

## 2021-05-12 PROCEDURE — 0JBB0ZZ EXCISION OF PERINEUM SUBCUTANEOUS TISSUE AND FASCIA, OPEN APPROACH: ICD-10-PCS | Performed by: SURGERY

## 2021-05-12 PROCEDURE — 0D9R0ZZ DRAINAGE OF ANAL SPHINCTER, OPEN APPROACH: ICD-10-PCS | Performed by: SURGERY

## 2021-05-12 RX ADMIN — HYDROCODONE BITARTRATE AND ACETAMINOPHEN PRN TAB: 5; 325 TABLET ORAL at 09:38

## 2021-05-12 RX ADMIN — HYDROCODONE BITARTRATE AND ACETAMINOPHEN PRN TAB: 5; 325 TABLET ORAL at 05:48

## 2021-05-12 RX ADMIN — VANCOMYCIN SCH MLS: 1.25 INJECTION, SOLUTION INTRAVENOUS at 02:30

## 2021-06-24 ENCOUNTER — HOSPITAL ENCOUNTER (EMERGENCY)
Dept: HOSPITAL 92 - ERS | Age: 31
Discharge: LEFT BEFORE DISCHARGE | End: 2021-06-24
Payer: COMMERCIAL

## 2021-06-24 DIAGNOSIS — Z53.21: Primary | ICD-10-CM

## 2021-06-24 LAB
ALBUMIN SERPL BCG-MCNC: 4.5 G/DL (ref 3.5–5)
ALP SERPL-CCNC: 100 U/L (ref 40–110)
ALT SERPL W P-5'-P-CCNC: 41 U/L (ref 8–55)
ANION GAP SERPL CALC-SCNC: 15 MMOL/L (ref 10–20)
AST SERPL-CCNC: (no result) U/L (ref 5–34)
BASOPHILS # BLD AUTO: 0 THOU/UL (ref 0–0.2)
BASOPHILS NFR BLD AUTO: 0 % (ref 0–1)
BILIRUB SERPL-MCNC: 0.7 MG/DL (ref 0.2–1.2)
BUN SERPL-MCNC: 14 MG/DL (ref 8.9–20.6)
CALCIUM SERPL-MCNC: 9.6 MG/DL (ref 7.8–10.44)
CHLORIDE SERPL-SCNC: 100 MMOL/L (ref 98–107)
CO2 SERPL-SCNC: 25 MMOL/L (ref 22–29)
CREAT CL PREDICTED SERPL C-G-VRATE: 0 ML/MIN (ref 70–130)
EOSINOPHIL # BLD AUTO: 0 THOU/UL (ref 0–0.7)
EOSINOPHIL NFR BLD AUTO: 0.5 % (ref 0–10)
GLOBULIN SER CALC-MCNC: 6 G/DL (ref 2.4–3.5)
GLUCOSE SERPL-MCNC: 188 MG/DL (ref 70–105)
HGB BLD-MCNC: 16 G/DL (ref 14–18)
LIPASE SERPL-CCNC: 94 U/L (ref 8–78)
LYMPHOCYTES # BLD: 1.1 THOU/UL (ref 1.2–3.4)
LYMPHOCYTES NFR BLD AUTO: 20.7 % (ref 21–51)
MCH RBC QN AUTO: 28.1 PG (ref 27–31)
MCV RBC AUTO: 75.9 FL (ref 78–98)
MONOCYTES # BLD AUTO: 0.3 THOU/UL (ref 0.11–0.59)
MONOCYTES NFR BLD AUTO: 4.8 % (ref 0–10)
NEUTROPHILS # BLD AUTO: 3.9 THOU/UL (ref 1.4–6.5)
NEUTROPHILS NFR BLD AUTO: 74.1 % (ref 42–75)
PLATELET # BLD AUTO: 155 THOU/UL (ref 130–400)
POTASSIUM SERPL-SCNC: 4.6 MMOL/L (ref 3.5–5.1)
RBC # BLD AUTO: 5.7 MILL/UL (ref 4.7–6.1)
SODIUM SERPL-SCNC: 135 MMOL/L (ref 136–145)
WBC # BLD AUTO: 5.2 THOU/UL (ref 4.8–10.8)

## 2021-06-24 PROCEDURE — 71045 X-RAY EXAM CHEST 1 VIEW: CPT

## 2021-06-24 PROCEDURE — 94760 N-INVAS EAR/PLS OXIMETRY 1: CPT

## 2021-06-24 PROCEDURE — 85025 COMPLETE CBC W/AUTO DIFF WBC: CPT

## 2021-06-24 PROCEDURE — 36415 COLL VENOUS BLD VENIPUNCTURE: CPT

## 2021-06-24 PROCEDURE — 83690 ASSAY OF LIPASE: CPT

## 2021-06-24 PROCEDURE — 93005 ELECTROCARDIOGRAM TRACING: CPT

## 2021-06-24 PROCEDURE — 36416 COLLJ CAPILLARY BLOOD SPEC: CPT

## 2021-06-24 PROCEDURE — 84484 ASSAY OF TROPONIN QUANT: CPT

## 2021-06-24 PROCEDURE — 80053 COMPREHEN METABOLIC PANEL: CPT

## 2021-08-08 ENCOUNTER — HOSPITAL ENCOUNTER (EMERGENCY)
Dept: HOSPITAL 92 - CSHERS | Age: 31
LOS: 1 days | Discharge: ANOTHER HOSPITAL | End: 2021-08-09
Payer: SELF-PAY

## 2021-08-08 DIAGNOSIS — Z21: ICD-10-CM

## 2021-08-08 DIAGNOSIS — L02.214: ICD-10-CM

## 2021-08-08 DIAGNOSIS — L03.314: Primary | ICD-10-CM

## 2021-08-08 DIAGNOSIS — E10.65: ICD-10-CM

## 2021-08-08 DIAGNOSIS — Z79.899: ICD-10-CM

## 2021-08-08 LAB
ALBUMIN SERPL BCG-MCNC: 3.9 G/DL (ref 3.5–5)
ALP SERPL-CCNC: 87 U/L (ref 40–110)
ALT SERPL W P-5'-P-CCNC: 39 U/L (ref 8–55)
ANALYZER IN CARDIO: (no result)
ANION GAP SERPL CALC-SCNC: 21 MMOL/L (ref 10–20)
AST SERPL-CCNC: 22 U/L (ref 5–34)
BASE EXCESS STD BLDV CALC-SCNC: -0.6 MEQ/L
BASOPHILS # BLD AUTO: 0 10X3/UL (ref 0–0.2)
BASOPHILS NFR BLD AUTO: 0.2 % (ref 0–2)
BILIRUB SERPL-MCNC: 0.6 MG/DL (ref 0.2–1.2)
BUN SERPL-MCNC: 13 MG/DL (ref 8.9–20.6)
CA-I BLDV-MCNC: 1.11 MMOL/L (ref 1.16–1.32)
CALCIUM SERPL-MCNC: 9.7 MG/DL (ref 7.8–10.44)
CHLORIDE BLDV-SCNC: 99 MMOL/L (ref 98–106)
CHLORIDE SERPL-SCNC: 98 MMOL/L (ref 98–107)
CO2 SERPL-SCNC: 17 MMOL/L (ref 22–29)
CREAT CL PREDICTED SERPL C-G-VRATE: 0 ML/MIN (ref 70–130)
EOSINOPHIL # BLD AUTO: 0 10X3/UL (ref 0–0.5)
EOSINOPHIL NFR BLD AUTO: 0.8 % (ref 0–6)
GLOBULIN SER CALC-MCNC: 3.9 G/DL (ref 2.4–3.5)
GLUCOSE SERPL-MCNC: 424 MG/DL (ref 70–105)
HCO3 BLDV-SCNC: 26 MEQ/L (ref 22–28)
HCT VFR BLDV CALC: 42 % (ref 42–52)
HGB BLD-MCNC: 14.9 G/DL (ref 13.5–17.5)
HGB BLDV-MCNC: 14.3 G/DL (ref 13.2–17.3)
LYMPHOCYTES NFR BLD AUTO: 21.7 % (ref 18–47)
MCH RBC QN AUTO: 26.8 PG (ref 27–33)
MCV RBC AUTO: 74 FL (ref 81.2–95.1)
MONOCYTES # BLD AUTO: 0.2 10X3/UL (ref 0–1.1)
MONOCYTES NFR BLD AUTO: 4.7 % (ref 0–10)
NEUTROPHILS # BLD AUTO: 3.7 10X3/UL (ref 1.5–8.4)
NEUTROPHILS NFR BLD AUTO: 71.4 % (ref 40–75)
PLATELET # BLD AUTO: 110 10X3/UL (ref 150–450)
POTASSIUM BLDV-SCNC: 5.24 MMOL/L (ref 3.7–5.3)
POTASSIUM SERPL-SCNC: 4.2 MMOL/L (ref 3.5–5.1)
RBC # BLD AUTO: 5.57 10X6/UL (ref 4.32–5.72)
SODIUM BLDV-SCNC: 132.8 MMOL/L (ref 133–146)
SODIUM SERPL-SCNC: 132 MMOL/L (ref 136–145)
SPECIMEN DRAWN FROM PATIENT: (no result)
WBC # BLD AUTO: 5.2 10X3/UL (ref 3.5–10.5)

## 2021-08-08 PROCEDURE — 82805 BLOOD GASES W/O2 SATURATION: CPT

## 2021-08-08 PROCEDURE — 96376 TX/PRO/DX INJ SAME DRUG ADON: CPT

## 2021-08-08 PROCEDURE — 96375 TX/PRO/DX INJ NEW DRUG ADDON: CPT

## 2021-08-08 PROCEDURE — 96367 TX/PROPH/DG ADDL SEQ IV INF: CPT

## 2021-08-08 PROCEDURE — 74177 CT ABD & PELVIS W/CONTRAST: CPT

## 2021-08-08 PROCEDURE — 80053 COMPREHEN METABOLIC PANEL: CPT

## 2021-08-08 PROCEDURE — 80048 BASIC METABOLIC PNL TOTAL CA: CPT

## 2021-08-08 PROCEDURE — 82010 KETONE BODYS QUAN: CPT

## 2021-08-08 PROCEDURE — 85025 COMPLETE CBC W/AUTO DIFF WBC: CPT

## 2021-08-08 PROCEDURE — 83605 ASSAY OF LACTIC ACID: CPT

## 2021-08-08 PROCEDURE — 96366 THER/PROPH/DIAG IV INF ADDON: CPT

## 2021-08-08 PROCEDURE — 36416 COLLJ CAPILLARY BLOOD SPEC: CPT

## 2021-08-08 PROCEDURE — 96365 THER/PROPH/DIAG IV INF INIT: CPT

## 2021-08-09 LAB
ANION GAP SERPL CALC-SCNC: 20 MMOL/L (ref 10–20)
BUN SERPL-MCNC: 13 MG/DL (ref 8.9–20.6)
CALCIUM SERPL-MCNC: 9.1 MG/DL (ref 7.8–10.44)
CHLORIDE SERPL-SCNC: 101 MMOL/L (ref 98–107)
CO2 SERPL-SCNC: 16 MMOL/L (ref 22–29)
CREAT CL PREDICTED SERPL C-G-VRATE: 0 ML/MIN (ref 70–130)
GLUCOSE SERPL-MCNC: 378 MG/DL (ref 70–105)
POTASSIUM SERPL-SCNC: 4.4 MMOL/L (ref 3.5–5.1)
SODIUM SERPL-SCNC: 133 MMOL/L (ref 136–145)

## 2021-08-28 ENCOUNTER — HOSPITAL ENCOUNTER (INPATIENT)
Dept: HOSPITAL 92 - CSHERS | Age: 31
LOS: 2 days | Discharge: LEFT BEFORE DISCHARGE | DRG: 603 | End: 2021-08-30
Attending: STUDENT IN AN ORGANIZED HEALTH CARE EDUCATION/TRAINING PROGRAM | Admitting: INTERNAL MEDICINE
Payer: SELF-PAY

## 2021-08-28 VITALS — BODY MASS INDEX: 37.6 KG/M2

## 2021-08-28 DIAGNOSIS — L03.314: Primary | ICD-10-CM

## 2021-08-28 DIAGNOSIS — Z21: ICD-10-CM

## 2021-08-28 DIAGNOSIS — Z20.822: ICD-10-CM

## 2021-08-28 DIAGNOSIS — Z79.4: ICD-10-CM

## 2021-08-28 DIAGNOSIS — E10.65: ICD-10-CM

## 2021-08-28 DIAGNOSIS — Z79.899: ICD-10-CM

## 2021-08-28 DIAGNOSIS — L02.214: ICD-10-CM

## 2021-08-28 DIAGNOSIS — F17.220: ICD-10-CM

## 2021-08-28 LAB
ALBUMIN SERPL BCG-MCNC: 3.8 G/DL (ref 3.5–5)
ALP SERPL-CCNC: 115 U/L (ref 40–110)
ALT SERPL W P-5'-P-CCNC: 34 U/L (ref 8–55)
ANION GAP SERPL CALC-SCNC: 18 MMOL/L (ref 10–20)
ANION GAP SERPL CALC-SCNC: 23 MMOL/L (ref 10–20)
AST SERPL-CCNC: 24 U/L (ref 5–34)
BASOPHILS # BLD AUTO: 0 10X3/UL (ref 0–0.2)
BASOPHILS NFR BLD AUTO: 0.4 % (ref 0–2)
BILIRUB SERPL-MCNC: 0.7 MG/DL (ref 0.2–1.2)
BUN SERPL-MCNC: 15 MG/DL (ref 8.9–20.6)
BUN SERPL-MCNC: 16 MG/DL (ref 8.9–20.6)
CALCIUM SERPL-MCNC: 9 MG/DL (ref 7.8–10.44)
CALCIUM SERPL-MCNC: 9.3 MG/DL (ref 7.8–10.44)
CHLORIDE SERPL-SCNC: 101 MMOL/L (ref 98–107)
CHLORIDE SERPL-SCNC: 104 MMOL/L (ref 98–107)
CO2 SERPL-SCNC: 11 MMOL/L (ref 22–29)
CO2 SERPL-SCNC: 16 MMOL/L (ref 22–29)
CREAT CL PREDICTED SERPL C-G-VRATE: 0 ML/MIN (ref 70–130)
CREAT CL PREDICTED SERPL C-G-VRATE: 0 ML/MIN (ref 70–130)
EOSINOPHIL # BLD AUTO: 0 10X3/UL (ref 0–0.5)
EOSINOPHIL NFR BLD AUTO: 0.8 % (ref 0–6)
GLOBULIN SER CALC-MCNC: 4.6 G/DL (ref 2.4–3.5)
GLUCOSE SERPL-MCNC: 437 MG/DL (ref 70–105)
GLUCOSE SERPL-MCNC: 542 MG/DL (ref 70–105)
GLUCOSE UR STRIP-MCNC: >=1000 MG/DL
HGB BLD-MCNC: 14.7 G/DL (ref 13.5–17.5)
LYMPHOCYTES NFR BLD AUTO: 20.3 % (ref 18–47)
MCH RBC QN AUTO: 28.2 PG (ref 27–33)
MCV RBC AUTO: 76.1 FL (ref 81.2–95.1)
MONOCYTES # BLD AUTO: 0.3 10X3/UL (ref 0–1.1)
MONOCYTES NFR BLD AUTO: 5.5 % (ref 0–10)
NEUTROPHILS # BLD AUTO: 3.4 10X3/UL (ref 1.5–8.4)
NEUTROPHILS NFR BLD AUTO: 72.2 % (ref 40–75)
PLATELET # BLD AUTO: 145 10X3/UL (ref 150–450)
POTASSIUM SERPL-SCNC: 4.5 MMOL/L (ref 3.5–5.1)
POTASSIUM SERPL-SCNC: 4.6 MMOL/L (ref 3.5–5.1)
PROT UR STRIP.AUTO-MCNC: 15 MG/DL
RBC # BLD AUTO: 5.22 10X6/UL (ref 4.32–5.72)
RBC UR QL AUTO: (no result) HPF (ref 0–3)
SODIUM SERPL-SCNC: 130 MMOL/L (ref 136–145)
SODIUM SERPL-SCNC: 133 MMOL/L (ref 136–145)
SP GR UR STRIP: 1.01 (ref 1–1.04)
WBC # BLD AUTO: 4.7 10X3/UL (ref 3.5–10.5)

## 2021-08-28 PROCEDURE — C9113 INJ PANTOPRAZOLE SODIUM, VIA: HCPCS

## 2021-08-28 PROCEDURE — 36416 COLLJ CAPILLARY BLOOD SPEC: CPT

## 2021-08-28 PROCEDURE — 80048 BASIC METABOLIC PNL TOTAL CA: CPT

## 2021-08-28 PROCEDURE — 83735 ASSAY OF MAGNESIUM: CPT

## 2021-08-28 PROCEDURE — 36415 COLL VENOUS BLD VENIPUNCTURE: CPT

## 2021-08-28 PROCEDURE — 80202 ASSAY OF VANCOMYCIN: CPT

## 2021-08-28 PROCEDURE — 96376 TX/PRO/DX INJ SAME DRUG ADON: CPT

## 2021-08-28 PROCEDURE — 82805 BLOOD GASES W/O2 SATURATION: CPT

## 2021-08-28 PROCEDURE — 83605 ASSAY OF LACTIC ACID: CPT

## 2021-08-28 PROCEDURE — 85025 COMPLETE CBC W/AUTO DIFF WBC: CPT

## 2021-08-28 PROCEDURE — 81015 MICROSCOPIC EXAM OF URINE: CPT

## 2021-08-28 PROCEDURE — 76999 ECHO EXAMINATION PROCEDURE: CPT

## 2021-08-28 PROCEDURE — 87040 BLOOD CULTURE FOR BACTERIA: CPT

## 2021-08-28 PROCEDURE — 93010 ELECTROCARDIOGRAM REPORT: CPT

## 2021-08-28 PROCEDURE — 96374 THER/PROPH/DIAG INJ IV PUSH: CPT

## 2021-08-28 PROCEDURE — 96366 THER/PROPH/DIAG IV INF ADDON: CPT

## 2021-08-28 PROCEDURE — 93005 ELECTROCARDIOGRAM TRACING: CPT

## 2021-08-28 PROCEDURE — 82010 KETONE BODYS QUAN: CPT

## 2021-08-28 PROCEDURE — 96365 THER/PROPH/DIAG IV INF INIT: CPT

## 2021-08-28 PROCEDURE — 80053 COMPREHEN METABOLIC PANEL: CPT

## 2021-08-28 PROCEDURE — 83036 HEMOGLOBIN GLYCOSYLATED A1C: CPT

## 2021-08-28 PROCEDURE — 96375 TX/PRO/DX INJ NEW DRUG ADDON: CPT

## 2021-08-28 PROCEDURE — 84100 ASSAY OF PHOSPHORUS: CPT

## 2021-08-28 PROCEDURE — 81003 URINALYSIS AUTO W/O SCOPE: CPT

## 2021-08-28 PROCEDURE — 0241U: CPT

## 2021-08-29 LAB
ANALYZER IN CARDIO: (no result)
ANION GAP SERPL CALC-SCNC: 19 MMOL/L (ref 10–20)
ANION GAP SERPL CALC-SCNC: 20 MMOL/L (ref 10–20)
BASE EXCESS STD BLDV CALC-SCNC: -2 MEQ/L
BASOPHILS # BLD AUTO: 0 10X3/UL (ref 0–0.2)
BASOPHILS NFR BLD AUTO: 0.3 % (ref 0–2)
BUN SERPL-MCNC: 14 MG/DL (ref 8.9–20.6)
BUN SERPL-MCNC: 15 MG/DL (ref 8.9–20.6)
CA-I BLDV-MCNC: 1.13 MMOL/L (ref 1.16–1.32)
CALCIUM SERPL-MCNC: 8.6 MG/DL (ref 7.8–10.44)
CALCIUM SERPL-MCNC: 9 MG/DL (ref 7.8–10.44)
CHLORIDE BLDV-SCNC: 107 MMOL/L (ref 98–106)
CHLORIDE SERPL-SCNC: 107 MMOL/L (ref 98–107)
CHLORIDE SERPL-SCNC: 110 MMOL/L (ref 98–107)
CO2 SERPL-SCNC: 13 MMOL/L (ref 22–29)
CO2 SERPL-SCNC: 14 MMOL/L (ref 22–29)
CREAT CL PREDICTED SERPL C-G-VRATE: 199 ML/MIN (ref 70–130)
CREAT CL PREDICTED SERPL C-G-VRATE: 239 ML/MIN (ref 70–130)
EOSINOPHIL # BLD AUTO: 0.1 10X3/UL (ref 0–0.5)
EOSINOPHIL NFR BLD AUTO: 1.5 % (ref 0–6)
GLUCOSE SERPL-MCNC: 164 MG/DL (ref 70–105)
GLUCOSE SERPL-MCNC: 232 MG/DL (ref 70–105)
HCO3 BLDV-SCNC: 23 MEQ/L (ref 22–28)
HCT VFR BLDV CALC: 39 % (ref 42–52)
HGB BLD-MCNC: 12.9 G/DL (ref 13.5–17.5)
HGB BLDV-MCNC: 13.4 G/DL (ref 13.2–17.3)
LYMPHOCYTES NFR BLD AUTO: 31 % (ref 18–47)
MAGNESIUM SERPL-MCNC: 1.7 MG/DL (ref 1.6–2.6)
MAGNESIUM SERPL-MCNC: 1.8 MG/DL (ref 1.6–2.6)
MCH RBC QN AUTO: 26.7 PG (ref 27–33)
MCV RBC AUTO: 78.3 FL (ref 81.2–95.1)
MONOCYTES # BLD AUTO: 0.2 10X3/UL (ref 0–1.1)
MONOCYTES NFR BLD AUTO: 6.2 % (ref 0–10)
NEUTROPHILS # BLD AUTO: 1.9 10X3/UL (ref 1.5–8.4)
NEUTROPHILS NFR BLD AUTO: 59.5 % (ref 40–75)
PLATELET # BLD AUTO: 96 10X3/UL (ref 150–450)
POTASSIUM BLDV-SCNC: 3.93 MMOL/L (ref 3.7–5.3)
POTASSIUM SERPL-SCNC: 4 MMOL/L (ref 3.5–5.1)
POTASSIUM SERPL-SCNC: 4.1 MMOL/L (ref 3.5–5.1)
RBC # BLD AUTO: 4.83 10X6/UL (ref 4.32–5.72)
SODIUM BLDV-SCNC: 138.6 MMOL/L (ref 133–146)
SODIUM SERPL-SCNC: 136 MMOL/L (ref 136–145)
SODIUM SERPL-SCNC: 139 MMOL/L (ref 136–145)
SPECIMEN DRAWN FROM PATIENT: (no result)
WBC # BLD AUTO: 3.2 10X3/UL (ref 3.5–10.5)

## 2021-08-29 RX ADMIN — INSULIN LISPRO PRN UNIT: 100 INJECTION, SOLUTION INTRAVENOUS; SUBCUTANEOUS at 20:35

## 2021-08-29 RX ADMIN — VANCOMYCIN SCH MLS: 2 INJECTION, SOLUTION INTRAVENOUS at 13:07

## 2021-08-29 RX ADMIN — INSULIN LISPRO PRN UNIT: 100 INJECTION, SOLUTION INTRAVENOUS; SUBCUTANEOUS at 16:30

## 2021-08-29 RX ADMIN — VANCOMYCIN SCH: 2 INJECTION, SOLUTION INTRAVENOUS at 12:42

## 2021-08-29 RX ADMIN — INSULIN HUMAN SCH UNIT: 100 INJECTION, SUSPENSION SUBCUTANEOUS at 13:04

## 2021-08-29 RX ADMIN — VANCOMYCIN SCH MLS: 2 INJECTION, SOLUTION INTRAVENOUS at 22:59

## 2021-08-29 RX ADMIN — HYDROCODONE BITARTRATE AND ACETAMINOPHEN PRN TAB: 5; 325 TABLET ORAL at 08:13

## 2021-08-29 RX ADMIN — HYDROCODONE BITARTRATE AND ACETAMINOPHEN PRN TAB: 5; 325 TABLET ORAL at 21:00

## 2021-08-29 RX ADMIN — INSULIN HUMAN SCH: 100 INJECTION, SUSPENSION SUBCUTANEOUS at 19:38

## 2021-08-30 VITALS — DIASTOLIC BLOOD PRESSURE: 85 MMHG | TEMPERATURE: 97.5 F | SYSTOLIC BLOOD PRESSURE: 137 MMHG

## 2021-08-30 LAB
BASOPHILS # BLD AUTO: 0 10X3/UL (ref 0–0.2)
BASOPHILS NFR BLD AUTO: 0.6 % (ref 0–2)
EOSINOPHIL # BLD AUTO: 0.1 10X3/UL (ref 0–0.5)
EOSINOPHIL NFR BLD AUTO: 2 % (ref 0–6)
HGB BLD-MCNC: 13.1 G/DL (ref 13.5–17.5)
LYMPHOCYTES NFR BLD AUTO: 31.3 % (ref 18–47)
MCH RBC QN AUTO: 27.2 PG (ref 27–33)
MCV RBC AUTO: 76.3 FL (ref 81.2–95.1)
MONOCYTES # BLD AUTO: 0.2 10X3/UL (ref 0–1.1)
MONOCYTES NFR BLD AUTO: 6.5 % (ref 0–10)
NEUTROPHILS # BLD AUTO: 2.1 10X3/UL (ref 1.5–8.4)
NEUTROPHILS NFR BLD AUTO: 58.2 % (ref 40–75)
PLATELET # BLD AUTO: 102 10X3/UL (ref 150–450)
RBC # BLD AUTO: 4.81 10X6/UL (ref 4.32–5.72)
VANCOMYCIN TROUGH SERPL-MCNC: 10.2 UG/ML
WBC # BLD AUTO: 3.5 10X3/UL (ref 3.5–10.5)

## 2021-08-30 RX ADMIN — INSULIN LISPRO PRN UNIT: 100 INJECTION, SOLUTION INTRAVENOUS; SUBCUTANEOUS at 11:38

## 2021-08-30 RX ADMIN — VANCOMYCIN SCH MLS: 2 INJECTION, SOLUTION INTRAVENOUS at 11:44

## 2021-08-30 RX ADMIN — INSULIN LISPRO PRN UNIT: 100 INJECTION, SOLUTION INTRAVENOUS; SUBCUTANEOUS at 08:49

## 2021-08-30 RX ADMIN — INSULIN HUMAN SCH UNIT: 100 INJECTION, SUSPENSION SUBCUTANEOUS at 08:51

## 2021-11-01 ENCOUNTER — HOSPITAL ENCOUNTER (INPATIENT)
Dept: HOSPITAL 92 - ERS | Age: 31
LOS: 4 days | Discharge: HOME | DRG: 562 | End: 2021-11-05
Attending: SURGERY | Admitting: SURGERY
Payer: COMMERCIAL

## 2021-11-01 VITALS — BODY MASS INDEX: 33.3 KG/M2

## 2021-11-01 DIAGNOSIS — S06.5X0A: ICD-10-CM

## 2021-11-01 DIAGNOSIS — E11.9: ICD-10-CM

## 2021-11-01 DIAGNOSIS — S02.19XA: ICD-10-CM

## 2021-11-01 DIAGNOSIS — S52.134A: Primary | ICD-10-CM

## 2021-11-01 DIAGNOSIS — S02.82XA: ICD-10-CM

## 2021-11-01 DIAGNOSIS — Z79.4: ICD-10-CM

## 2021-11-01 DIAGNOSIS — G93.89: ICD-10-CM

## 2021-11-01 DIAGNOSIS — S06.6X0A: ICD-10-CM

## 2021-11-01 DIAGNOSIS — Z79.899: ICD-10-CM

## 2021-11-01 DIAGNOSIS — B20: ICD-10-CM

## 2021-11-01 DIAGNOSIS — S02.85XA: ICD-10-CM

## 2021-11-01 DIAGNOSIS — Z91.19: ICD-10-CM
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
## 2021-11-01 LAB
ALBUMIN SERPL BCG-MCNC: 4 G/DL (ref 3.5–5)
ALP SERPL-CCNC: 94 U/L (ref 40–110)
ALT SERPL W P-5'-P-CCNC: 84 U/L (ref 8–55)
ANION GAP SERPL CALC-SCNC: 24 MMOL/L (ref 10–20)
APTT PPP: 23.5 SEC (ref 22.9–36.1)
AST SERPL-CCNC: 89 U/L (ref 5–34)
BASOPHILS # BLD AUTO: 0 THOU/UL (ref 0–0.2)
BASOPHILS NFR BLD AUTO: 0.7 % (ref 0–1)
BILIRUB SERPL-MCNC: 0.6 MG/DL (ref 0.2–1.2)
BUN SERPL-MCNC: 14 MG/DL (ref 8.9–20.6)
CALCIUM SERPL-MCNC: 9.1 MG/DL (ref 7.8–10.44)
CHLORIDE SERPL-SCNC: 101 MMOL/L (ref 98–107)
CK SERPL-CCNC: 55 U/L (ref 30–200)
CO2 SERPL-SCNC: 13 MMOL/L (ref 22–29)
CREAT CL PREDICTED SERPL C-G-VRATE: 0 ML/MIN (ref 70–130)
EOSINOPHIL # BLD AUTO: 0 THOU/UL (ref 0–0.7)
EOSINOPHIL NFR BLD AUTO: 0.6 % (ref 0–10)
GLOBULIN SER CALC-MCNC: 3.9 G/DL (ref 2.4–3.5)
GLUCOSE SERPL-MCNC: 482 MG/DL (ref 70–105)
HGB BLD-MCNC: 18.6 G/DL (ref 14–18)
INR PPP: 0.9
LYMPHOCYTES # BLD: 2.4 THOU/UL (ref 1.2–3.4)
LYMPHOCYTES NFR BLD AUTO: 37.9 % (ref 21–51)
MAGNESIUM SERPL-MCNC: 1.7 MG/DL (ref 1.6–2.6)
MCH RBC QN AUTO: 28.5 PG (ref 27–31)
MCV RBC AUTO: 74 FL (ref 78–98)
MDIFF COMPLETE?: YES
MICROCYTES BLD QL SMEAR: (no result) (100X)
MONOCYTES # BLD AUTO: 0.2 THOU/UL (ref 0.11–0.59)
MONOCYTES NFR BLD AUTO: 3.8 % (ref 0–10)
NEUTROPHILS # BLD AUTO: 3.6 THOU/UL (ref 1.4–6.5)
NEUTROPHILS NFR BLD AUTO: 57 % (ref 42–75)
PLATELET # BLD AUTO: 274 THOU/UL (ref 130–400)
POTASSIUM SERPL-SCNC: 5.3 MMOL/L (ref 3.5–5.1)
PROTHROMBIN TIME: 12.5 SEC (ref 12–14.7)
RBC # BLD AUTO: 6.54 MILL/UL (ref 4.7–6.1)
SARS-COV-2 RNA RESP QL NAA+PROBE: DETECTED
SODIUM SERPL-SCNC: 133 MMOL/L (ref 136–145)
WBC # BLD AUTO: 6.4 THOU/UL (ref 4.8–10.8)

## 2021-11-01 PROCEDURE — 72125 CT NECK SPINE W/O DYE: CPT

## 2021-11-01 PROCEDURE — 86901 BLOOD TYPING SEROLOGIC RH(D): CPT

## 2021-11-01 PROCEDURE — 80306 DRUG TEST PRSMV INSTRMNT: CPT

## 2021-11-01 PROCEDURE — S0028 INJECTION, FAMOTIDINE, 20 MG: HCPCS

## 2021-11-01 PROCEDURE — 96375 TX/PRO/DX INJ NEW DRUG ADDON: CPT

## 2021-11-01 PROCEDURE — 74177 CT ABD & PELVIS W/CONTRAST: CPT

## 2021-11-01 PROCEDURE — U0002 COVID-19 LAB TEST NON-CDC: HCPCS

## 2021-11-01 PROCEDURE — 83735 ASSAY OF MAGNESIUM: CPT

## 2021-11-01 PROCEDURE — 72170 X-RAY EXAM OF PELVIS: CPT

## 2021-11-01 PROCEDURE — 80048 BASIC METABOLIC PNL TOTAL CA: CPT

## 2021-11-01 PROCEDURE — 85007 BL SMEAR W/DIFF WBC COUNT: CPT

## 2021-11-01 PROCEDURE — 82550 ASSAY OF CK (CPK): CPT

## 2021-11-01 PROCEDURE — 36415 COLL VENOUS BLD VENIPUNCTURE: CPT

## 2021-11-01 PROCEDURE — 85610 PROTHROMBIN TIME: CPT

## 2021-11-01 PROCEDURE — 36416 COLLJ CAPILLARY BLOOD SPEC: CPT

## 2021-11-01 PROCEDURE — G0390 TRAUMA RESPONS W/HOSP CRITI: HCPCS

## 2021-11-01 PROCEDURE — 86850 RBC ANTIBODY SCREEN: CPT

## 2021-11-01 PROCEDURE — 70486 CT MAXILLOFACIAL W/O DYE: CPT

## 2021-11-01 PROCEDURE — 70450 CT HEAD/BRAIN W/O DYE: CPT

## 2021-11-01 PROCEDURE — 84100 ASSAY OF PHOSPHORUS: CPT

## 2021-11-01 PROCEDURE — 85027 COMPLETE CBC AUTOMATED: CPT

## 2021-11-01 PROCEDURE — 83036 HEMOGLOBIN GLYCOSYLATED A1C: CPT

## 2021-11-01 PROCEDURE — 82010 KETONE BODYS QUAN: CPT

## 2021-11-01 PROCEDURE — 96365 THER/PROPH/DIAG IV INF INIT: CPT

## 2021-11-01 PROCEDURE — 90715 TDAP VACCINE 7 YRS/> IM: CPT

## 2021-11-01 PROCEDURE — 80053 COMPREHEN METABOLIC PANEL: CPT

## 2021-11-01 PROCEDURE — 85730 THROMBOPLASTIN TIME PARTIAL: CPT

## 2021-11-01 PROCEDURE — 90471 IMMUNIZATION ADMIN: CPT

## 2021-11-01 PROCEDURE — 86900 BLOOD TYPING SEROLOGIC ABO: CPT

## 2021-11-01 PROCEDURE — 85025 COMPLETE CBC W/AUTO DIFF WBC: CPT

## 2021-11-01 PROCEDURE — 84484 ASSAY OF TROPONIN QUANT: CPT

## 2021-11-01 PROCEDURE — 71260 CT THORAX DX C+: CPT

## 2021-11-01 PROCEDURE — 71045 X-RAY EXAM CHEST 1 VIEW: CPT

## 2021-11-01 RX ADMIN — FAMOTIDINE SCH MG: 10 INJECTION, SOLUTION INTRAVENOUS at 20:29

## 2021-11-01 RX ADMIN — INSULIN LISPRO PRN UNIT: 100 INJECTION, SOLUTION INTRAVENOUS; SUBCUTANEOUS at 19:41

## 2021-11-01 RX ADMIN — DOCUSATE SODIUM 50 MG AND SENNOSIDES 8.6 MG SCH TAB: 8.6; 5 TABLET, FILM COATED ORAL at 21:48

## 2021-11-02 LAB
ALBUMIN SERPL BCG-MCNC: 4.1 G/DL (ref 3.5–5)
ALP SERPL-CCNC: 83 U/L (ref 40–110)
ALT SERPL W P-5'-P-CCNC: 52 U/L (ref 8–55)
ANION GAP SERPL CALC-SCNC: 16 MMOL/L (ref 10–20)
AST SERPL-CCNC: 29 U/L (ref 5–34)
BILIRUB SERPL-MCNC: 1 MG/DL (ref 0.2–1.2)
BUN SERPL-MCNC: 12 MG/DL (ref 8.9–20.6)
CALCIUM SERPL-MCNC: 9.1 MG/DL (ref 7.8–10.44)
CHLORIDE SERPL-SCNC: 102 MMOL/L (ref 98–107)
CK SERPL-CCNC: 198 U/L (ref 30–200)
CO2 SERPL-SCNC: 22 MMOL/L (ref 22–29)
CREAT CL PREDICTED SERPL C-G-VRATE: 173 ML/MIN (ref 70–130)
DRUG SCREEN CUTOFF: (no result)
GLOBULIN SER CALC-MCNC: 2.9 G/DL (ref 2.4–3.5)
GLUCOSE SERPL-MCNC: 333 MG/DL (ref 70–105)
HGB BLD-MCNC: 15.9 G/DL (ref 14–18)
MAGNESIUM SERPL-MCNC: 1.8 MG/DL (ref 1.6–2.6)
MCH RBC QN AUTO: 26.6 PG (ref 27–31)
MCV RBC AUTO: 76.3 FL (ref 78–98)
MDIFF COMPLETE?: YES
MICROCYTES BLD QL SMEAR: (no result) (100X)
PLATELET # BLD AUTO: 145 THOU/UL (ref 130–400)
POLYCHROMASIA BLD QL SMEAR: (no result) (100X)
POTASSIUM SERPL-SCNC: 4.8 MMOL/L (ref 3.5–5.1)
RBC # BLD AUTO: 5.98 MILL/UL (ref 4.7–6.1)
SODIUM SERPL-SCNC: 135 MMOL/L (ref 136–145)
WBC # BLD AUTO: 6.6 THOU/UL (ref 4.8–10.8)

## 2021-11-02 RX ADMIN — ONDANSETRON PRN MG: 2 INJECTION INTRAMUSCULAR; INTRAVENOUS at 02:58

## 2021-11-02 RX ADMIN — INSULIN LISPRO PRN UNIT: 100 INJECTION, SOLUTION INTRAVENOUS; SUBCUTANEOUS at 00:55

## 2021-11-02 RX ADMIN — BACITRACIN ZINC SCH APPLIC: 500 OINTMENT TOPICAL at 09:21

## 2021-11-02 RX ADMIN — Medication SCH MLS: at 14:51

## 2021-11-02 RX ADMIN — Medication SCH MLS: at 06:02

## 2021-11-02 RX ADMIN — ACETAMINOPHEN AND CODEINE PHOSPHATE SCH TAB: 300; 30 TABLET ORAL at 18:45

## 2021-11-02 RX ADMIN — FAMOTIDINE SCH MG: 10 INJECTION, SOLUTION INTRAVENOUS at 21:23

## 2021-11-02 RX ADMIN — DOCUSATE SODIUM 50 MG AND SENNOSIDES 8.6 MG SCH TAB: 8.6; 5 TABLET, FILM COATED ORAL at 21:21

## 2021-11-02 RX ADMIN — ACETAMINOPHEN AND CODEINE PHOSPHATE SCH: 300; 30 TABLET ORAL at 12:35

## 2021-11-02 RX ADMIN — INSULIN LISPRO PRN UNIT: 100 INJECTION, SOLUTION INTRAVENOUS; SUBCUTANEOUS at 06:41

## 2021-11-02 RX ADMIN — ONDANSETRON PRN MG: 2 INJECTION INTRAMUSCULAR; INTRAVENOUS at 12:00

## 2021-11-02 RX ADMIN — DOCUSATE SODIUM 50 MG AND SENNOSIDES 8.6 MG SCH: 8.6; 5 TABLET, FILM COATED ORAL at 09:22

## 2021-11-02 RX ADMIN — INSULIN LISPRO PRN UNIT: 100 INJECTION, SOLUTION INTRAVENOUS; SUBCUTANEOUS at 11:16

## 2021-11-02 RX ADMIN — INSULIN LISPRO SCH UNIT: 100 INJECTION, SOLUTION INTRAVENOUS; SUBCUTANEOUS at 18:52

## 2021-11-02 RX ADMIN — INSULIN LISPRO SCH: 100 INJECTION, SOLUTION INTRAVENOUS; SUBCUTANEOUS at 11:32

## 2021-11-02 RX ADMIN — PSYLLIUM HUSK SCH: 3.4 POWDER ORAL at 09:22

## 2021-11-02 RX ADMIN — SCOPALAMINE SCH MG: 1 PATCH, EXTENDED RELEASE TRANSDERMAL at 11:30

## 2021-11-02 RX ADMIN — FAMOTIDINE SCH MG: 10 INJECTION, SOLUTION INTRAVENOUS at 09:20

## 2021-11-02 RX ADMIN — Medication SCH MLS: at 21:22

## 2021-11-03 LAB
ANION GAP SERPL CALC-SCNC: 16 MMOL/L (ref 10–20)
BASOPHILS # BLD AUTO: 0 THOU/UL (ref 0–0.2)
BASOPHILS NFR BLD AUTO: 0.2 % (ref 0–1)
BUN SERPL-MCNC: 10 MG/DL (ref 8.9–20.6)
CALCIUM SERPL-MCNC: 9.3 MG/DL (ref 7.8–10.44)
CHLORIDE SERPL-SCNC: 101 MMOL/L (ref 98–107)
CO2 SERPL-SCNC: 27 MMOL/L (ref 22–29)
CREAT CL PREDICTED SERPL C-G-VRATE: 190 ML/MIN (ref 70–130)
EOSINOPHIL # BLD AUTO: 0 THOU/UL (ref 0–0.7)
EOSINOPHIL NFR BLD AUTO: 0.2 % (ref 0–10)
GLUCOSE SERPL-MCNC: 381 MG/DL (ref 70–105)
HGB BLD-MCNC: 15.4 G/DL (ref 14–18)
LYMPHOCYTES # BLD: 0.8 THOU/UL (ref 1.2–3.4)
LYMPHOCYTES NFR BLD AUTO: 10.8 % (ref 21–51)
MAGNESIUM SERPL-MCNC: 2.2 MG/DL (ref 1.6–2.6)
MCH RBC QN AUTO: 25.9 PG (ref 27–31)
MCV RBC AUTO: 76.6 FL (ref 78–98)
MONOCYTES # BLD AUTO: 0.4 THOU/UL (ref 0.11–0.59)
MONOCYTES NFR BLD AUTO: 6 % (ref 0–10)
NEUTROPHILS # BLD AUTO: 6 THOU/UL (ref 1.4–6.5)
NEUTROPHILS NFR BLD AUTO: 82.9 % (ref 42–75)
PLATELET # BLD AUTO: 159 THOU/UL (ref 130–400)
POTASSIUM SERPL-SCNC: 4.5 MMOL/L (ref 3.5–5.1)
RBC # BLD AUTO: 5.95 MILL/UL (ref 4.7–6.1)
SODIUM SERPL-SCNC: 139 MMOL/L (ref 136–145)
WBC # BLD AUTO: 7.2 THOU/UL (ref 4.8–10.8)

## 2021-11-03 RX ADMIN — INSULIN LISPRO PRN UNITS: 100 INJECTION, SOLUTION INTRAVENOUS; SUBCUTANEOUS at 13:27

## 2021-11-03 RX ADMIN — ACETAMINOPHEN AND CODEINE PHOSPHATE SCH TAB: 300; 30 TABLET ORAL at 18:16

## 2021-11-03 RX ADMIN — ACETAMINOPHEN AND CODEINE PHOSPHATE SCH: 300; 30 TABLET ORAL at 06:50

## 2021-11-03 RX ADMIN — INSULIN LISPRO PRN UNITS: 100 INJECTION, SOLUTION INTRAVENOUS; SUBCUTANEOUS at 10:29

## 2021-11-03 RX ADMIN — BACITRACIN ZINC SCH APPLIC: 500 OINTMENT TOPICAL at 22:13

## 2021-11-03 RX ADMIN — DOCUSATE SODIUM 50 MG AND SENNOSIDES 8.6 MG SCH: 8.6; 5 TABLET, FILM COATED ORAL at 13:06

## 2021-11-03 RX ADMIN — ONDANSETRON PRN MG: 2 INJECTION INTRAMUSCULAR; INTRAVENOUS at 18:15

## 2021-11-03 RX ADMIN — DOCUSATE SODIUM 50 MG AND SENNOSIDES 8.6 MG SCH TAB: 8.6; 5 TABLET, FILM COATED ORAL at 22:12

## 2021-11-03 RX ADMIN — ACETAMINOPHEN AND CODEINE PHOSPHATE SCH TAB: 300; 30 TABLET ORAL at 00:05

## 2021-11-03 RX ADMIN — ACETAMINOPHEN AND CODEINE PHOSPHATE SCH TAB: 300; 30 TABLET ORAL at 10:26

## 2021-11-03 RX ADMIN — INSULIN GLARGINE SCH UNIT: 100 INJECTION, SOLUTION SUBCUTANEOUS at 10:27

## 2021-11-03 RX ADMIN — INSULIN LISPRO SCH: 100 INJECTION, SOLUTION INTRAVENOUS; SUBCUTANEOUS at 09:08

## 2021-11-03 RX ADMIN — INSULIN GLARGINE SCH UNIT: 100 INJECTION, SOLUTION SUBCUTANEOUS at 22:12

## 2021-11-03 RX ADMIN — INSULIN LISPRO PRN UNITS: 100 INJECTION, SOLUTION INTRAVENOUS; SUBCUTANEOUS at 00:06

## 2021-11-03 RX ADMIN — ONDANSETRON PRN MG: 2 INJECTION INTRAMUSCULAR; INTRAVENOUS at 06:11

## 2021-11-03 RX ADMIN — PSYLLIUM HUSK SCH: 3.4 POWDER ORAL at 10:36

## 2021-11-03 RX ADMIN — INSULIN LISPRO PRN UNITS: 100 INJECTION, SOLUTION INTRAVENOUS; SUBCUTANEOUS at 18:17

## 2021-11-03 RX ADMIN — BACITRACIN ZINC SCH APPLIC: 500 OINTMENT TOPICAL at 10:36

## 2021-11-03 RX ADMIN — Medication SCH MLS: at 06:10

## 2021-11-03 RX ADMIN — INSULIN LISPRO PRN UNITS: 100 INJECTION, SOLUTION INTRAVENOUS; SUBCUTANEOUS at 06:17

## 2021-11-03 RX ADMIN — BACITRACIN ZINC SCH APPLIC: 500 OINTMENT TOPICAL at 03:03

## 2021-11-03 RX ADMIN — INSULIN LISPRO PRN UNITS: 100 INJECTION, SOLUTION INTRAVENOUS; SUBCUTANEOUS at 03:02

## 2021-11-04 LAB
ANION GAP SERPL CALC-SCNC: 11 MMOL/L (ref 10–20)
BUN SERPL-MCNC: 11 MG/DL (ref 8.9–20.6)
CALCIUM SERPL-MCNC: 9 MG/DL (ref 7.8–10.44)
CHLORIDE SERPL-SCNC: 98 MMOL/L (ref 98–107)
CO2 SERPL-SCNC: 33 MMOL/L (ref 22–29)
CREAT CL PREDICTED SERPL C-G-VRATE: 217 ML/MIN (ref 70–130)
GLUCOSE SERPL-MCNC: 310 MG/DL (ref 70–105)
MAGNESIUM SERPL-MCNC: 1.9 MG/DL (ref 1.6–2.6)
POTASSIUM SERPL-SCNC: 4.1 MMOL/L (ref 3.5–5.1)
SODIUM SERPL-SCNC: 138 MMOL/L (ref 136–145)

## 2021-11-04 RX ADMIN — INSULIN GLARGINE SCH: 100 INJECTION, SOLUTION SUBCUTANEOUS at 11:19

## 2021-11-04 RX ADMIN — ONDANSETRON PRN MG: 2 INJECTION INTRAMUSCULAR; INTRAVENOUS at 12:04

## 2021-11-04 RX ADMIN — ACETAMINOPHEN AND CODEINE PHOSPHATE SCH TAB: 300; 30 TABLET ORAL at 23:42

## 2021-11-04 RX ADMIN — PSYLLIUM HUSK SCH: 3.4 POWDER ORAL at 08:56

## 2021-11-04 RX ADMIN — ACETAMINOPHEN AND CODEINE PHOSPHATE SCH TAB: 300; 30 TABLET ORAL at 00:13

## 2021-11-04 RX ADMIN — DOCUSATE SODIUM 50 MG AND SENNOSIDES 8.6 MG SCH TAB: 8.6; 5 TABLET, FILM COATED ORAL at 19:41

## 2021-11-04 RX ADMIN — INSULIN LISPRO PRN UNITS: 100 INJECTION, SOLUTION INTRAVENOUS; SUBCUTANEOUS at 16:39

## 2021-11-04 RX ADMIN — INSULIN LISPRO PRN UNITS: 100 INJECTION, SOLUTION INTRAVENOUS; SUBCUTANEOUS at 11:59

## 2021-11-04 RX ADMIN — INSULIN LISPRO PRN UNITS: 100 INJECTION, SOLUTION INTRAVENOUS; SUBCUTANEOUS at 08:55

## 2021-11-04 RX ADMIN — INSULIN LISPRO PRN UNITS: 100 INJECTION, SOLUTION INTRAVENOUS; SUBCUTANEOUS at 04:13

## 2021-11-04 RX ADMIN — INSULIN GLARGINE SCH UNIT: 100 INJECTION, SOLUTION SUBCUTANEOUS at 08:55

## 2021-11-04 RX ADMIN — ACETAMINOPHEN AND CODEINE PHOSPHATE SCH TAB: 300; 30 TABLET ORAL at 11:58

## 2021-11-04 RX ADMIN — INSULIN LISPRO PRN UNITS: 100 INJECTION, SOLUTION INTRAVENOUS; SUBCUTANEOUS at 00:15

## 2021-11-04 RX ADMIN — DOCUSATE SODIUM 50 MG AND SENNOSIDES 8.6 MG SCH TAB: 8.6; 5 TABLET, FILM COATED ORAL at 08:55

## 2021-11-04 RX ADMIN — ACETAMINOPHEN AND CODEINE PHOSPHATE SCH TAB: 300; 30 TABLET ORAL at 05:40

## 2021-11-04 RX ADMIN — BACITRACIN ZINC SCH APPLIC: 500 OINTMENT TOPICAL at 08:58

## 2021-11-04 RX ADMIN — BACITRACIN ZINC SCH APPLIC: 500 OINTMENT TOPICAL at 19:42

## 2021-11-04 RX ADMIN — INSULIN GLARGINE SCH UNIT: 100 INJECTION, SOLUTION SUBCUTANEOUS at 19:41

## 2021-11-04 RX ADMIN — ACETAMINOPHEN AND CODEINE PHOSPHATE SCH TAB: 300; 30 TABLET ORAL at 18:23

## 2021-11-05 VITALS — TEMPERATURE: 98.2 F | DIASTOLIC BLOOD PRESSURE: 88 MMHG | SYSTOLIC BLOOD PRESSURE: 133 MMHG

## 2021-11-05 RX ADMIN — PSYLLIUM HUSK SCH PK: 3.4 POWDER ORAL at 08:46

## 2021-11-05 RX ADMIN — SCOPALAMINE SCH: 1 PATCH, EXTENDED RELEASE TRANSDERMAL at 15:58

## 2021-11-05 RX ADMIN — INSULIN LISPRO PRN UNITS: 100 INJECTION, SOLUTION INTRAVENOUS; SUBCUTANEOUS at 05:44

## 2021-11-05 RX ADMIN — ACETAMINOPHEN AND CODEINE PHOSPHATE SCH: 300; 30 TABLET ORAL at 16:56

## 2021-11-05 RX ADMIN — INSULIN LISPRO PRN UNITS: 100 INJECTION, SOLUTION INTRAVENOUS; SUBCUTANEOUS at 12:43

## 2021-11-05 RX ADMIN — ACETAMINOPHEN AND CODEINE PHOSPHATE SCH TAB: 300; 30 TABLET ORAL at 12:40

## 2021-11-05 RX ADMIN — ACETAMINOPHEN AND CODEINE PHOSPHATE SCH TAB: 300; 30 TABLET ORAL at 05:41

## 2021-11-05 RX ADMIN — DOCUSATE SODIUM 50 MG AND SENNOSIDES 8.6 MG SCH TAB: 8.6; 5 TABLET, FILM COATED ORAL at 08:46

## 2021-11-05 RX ADMIN — BACITRACIN ZINC SCH APPLIC: 500 OINTMENT TOPICAL at 08:45

## 2021-11-05 RX ADMIN — INSULIN GLARGINE SCH UNIT: 100 INJECTION, SOLUTION SUBCUTANEOUS at 08:47

## 2021-11-10 ENCOUNTER — HOSPITAL ENCOUNTER (INPATIENT)
Dept: HOSPITAL 92 - ERS | Age: 31
LOS: 2 days | Discharge: HOME | DRG: 82 | End: 2021-11-12
Attending: SURGERY | Admitting: SURGERY
Payer: COMMERCIAL

## 2021-11-10 VITALS — BODY MASS INDEX: 34.4 KG/M2

## 2021-11-10 DIAGNOSIS — B20: ICD-10-CM

## 2021-11-10 DIAGNOSIS — E11.9: ICD-10-CM
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
## 2021-11-10 DIAGNOSIS — S06.5X9A: Primary | ICD-10-CM

## 2021-11-10 DIAGNOSIS — Z79.899: ICD-10-CM

## 2021-11-10 DIAGNOSIS — Z20.822: ICD-10-CM

## 2021-11-10 DIAGNOSIS — Z79.84: ICD-10-CM

## 2021-11-10 DIAGNOSIS — Z79.4: ICD-10-CM

## 2021-11-10 DIAGNOSIS — S06.6X9A: ICD-10-CM

## 2021-11-10 DIAGNOSIS — Z91.14: ICD-10-CM

## 2021-11-10 DIAGNOSIS — F07.81: ICD-10-CM

## 2021-11-10 DIAGNOSIS — S06.A0XA: ICD-10-CM

## 2021-11-10 DIAGNOSIS — R40.2412: ICD-10-CM

## 2021-11-10 DIAGNOSIS — S02.19XA: ICD-10-CM

## 2021-11-10 DIAGNOSIS — G44.309: ICD-10-CM

## 2021-11-10 LAB
ALBUMIN SERPL BCG-MCNC: 4.3 G/DL (ref 3.5–5)
ALP SERPL-CCNC: 97 U/L (ref 40–110)
ALT SERPL W P-5'-P-CCNC: 26 U/L (ref 8–55)
ANION GAP SERPL CALC-SCNC: 13 MMOL/L (ref 10–20)
APTT PPP: 29.3 SEC (ref 22.9–36.1)
AST SERPL-CCNC: 15 U/L (ref 5–34)
BASOPHILS # BLD AUTO: 0 THOU/UL (ref 0–0.2)
BASOPHILS NFR BLD AUTO: 0.1 % (ref 0–1)
BILIRUB SERPL-MCNC: 1.3 MG/DL (ref 0.2–1.2)
BUN SERPL-MCNC: 12 MG/DL (ref 8.9–20.6)
CALCIUM SERPL-MCNC: 9.9 MG/DL (ref 7.8–10.44)
CHLORIDE SERPL-SCNC: 94 MMOL/L (ref 98–107)
CO2 SERPL-SCNC: 29 MMOL/L (ref 22–29)
CREAT CL PREDICTED SERPL C-G-VRATE: 0 ML/MIN (ref 70–130)
DRUG SCREEN CUTOFF: (no result)
EOSINOPHIL # BLD AUTO: 0 THOU/UL (ref 0–0.7)
EOSINOPHIL NFR BLD AUTO: 0.4 % (ref 0–10)
GLOBULIN SER CALC-MCNC: 3 G/DL (ref 2.4–3.5)
GLUCOSE SERPL-MCNC: 304 MG/DL (ref 70–105)
HGB BLD-MCNC: 16 G/DL (ref 14–18)
INR PPP: 1
LYMPHOCYTES # BLD: 1.2 THOU/UL (ref 1.2–3.4)
LYMPHOCYTES NFR BLD AUTO: 17.9 % (ref 21–51)
MCH RBC QN AUTO: 25.5 PG (ref 27–31)
MCV RBC AUTO: 76.8 FL (ref 78–98)
MONOCYTES # BLD AUTO: 0.4 THOU/UL (ref 0.11–0.59)
MONOCYTES NFR BLD AUTO: 5.3 % (ref 0–10)
NEUTROPHILS # BLD AUTO: 5.1 THOU/UL (ref 1.4–6.5)
NEUTROPHILS NFR BLD AUTO: 76.2 % (ref 42–75)
PLATELET # BLD AUTO: 139 THOU/UL (ref 130–400)
POTASSIUM SERPL-SCNC: 5 MMOL/L (ref 3.5–5.1)
PROT UR STRIP.AUTO-MCNC: 10 MG/DL
PROTHROMBIN TIME: 13 SEC (ref 12–14.7)
RBC # BLD AUTO: 6.26 MILL/UL (ref 4.7–6.1)
RBC UR QL AUTO: (no result) HPF (ref 0–3)
SARS-COV-2 IGG SERPL IA-ACNC: 0.1 S/CO (ref ?–1.4)
SARS-COV-2 RNA RESP QL NAA+PROBE: DETECTED
SODIUM SERPL-SCNC: 131 MMOL/L (ref 136–145)
SP GR UR STRIP: 1.02 (ref 1–1.04)
WBC # BLD AUTO: 6.7 THOU/UL (ref 4.8–10.8)

## 2021-11-10 PROCEDURE — 84146 ASSAY OF PROLACTIN: CPT

## 2021-11-10 PROCEDURE — 86900 BLOOD TYPING SEROLOGIC ABO: CPT

## 2021-11-10 PROCEDURE — 86769 SARS-COV-2 COVID-19 ANTIBODY: CPT

## 2021-11-10 PROCEDURE — 81001 URINALYSIS AUTO W/SCOPE: CPT

## 2021-11-10 PROCEDURE — 80048 BASIC METABOLIC PNL TOTAL CA: CPT

## 2021-11-10 PROCEDURE — U0002 COVID-19 LAB TEST NON-CDC: HCPCS

## 2021-11-10 PROCEDURE — 86850 RBC ANTIBODY SCREEN: CPT

## 2021-11-10 PROCEDURE — 86901 BLOOD TYPING SEROLOGIC RH(D): CPT

## 2021-11-10 PROCEDURE — 85610 PROTHROMBIN TIME: CPT

## 2021-11-10 PROCEDURE — 80306 DRUG TEST PRSMV INSTRMNT: CPT

## 2021-11-10 PROCEDURE — 85730 THROMBOPLASTIN TIME PARTIAL: CPT

## 2021-11-10 PROCEDURE — 8E0ZXY6 ISOLATION: ICD-10-PCS | Performed by: PHYSICIAN ASSISTANT

## 2021-11-10 PROCEDURE — 83605 ASSAY OF LACTIC ACID: CPT

## 2021-11-10 PROCEDURE — 36415 COLL VENOUS BLD VENIPUNCTURE: CPT

## 2021-11-10 PROCEDURE — 70450 CT HEAD/BRAIN W/O DYE: CPT

## 2021-11-10 PROCEDURE — 93005 ELECTROCARDIOGRAM TRACING: CPT

## 2021-11-10 PROCEDURE — 80053 COMPREHEN METABOLIC PANEL: CPT

## 2021-11-10 PROCEDURE — 36416 COLLJ CAPILLARY BLOOD SPEC: CPT

## 2021-11-10 PROCEDURE — 3E0333Z INTRODUCTION OF ANTI-INFLAMMATORY INTO PERIPHERAL VEIN, PERCUTANEOUS APPROACH: ICD-10-PCS | Performed by: PHYSICIAN ASSISTANT

## 2021-11-10 PROCEDURE — 85025 COMPLETE CBC W/AUTO DIFF WBC: CPT

## 2021-11-10 RX ADMIN — INSULIN LISPRO PRN UNIT: 100 INJECTION, SOLUTION INTRAVENOUS; SUBCUTANEOUS at 21:47

## 2021-11-10 RX ADMIN — INSULIN GLARGINE SCH UNIT: 100 INJECTION, SOLUTION SUBCUTANEOUS at 21:47

## 2021-11-11 LAB
ANION GAP SERPL CALC-SCNC: 15 MMOL/L (ref 10–20)
BASOPHILS # BLD AUTO: 0 THOU/UL (ref 0–0.2)
BASOPHILS NFR BLD AUTO: 0.1 % (ref 0–1)
BUN SERPL-MCNC: 14 MG/DL (ref 8.9–20.6)
CALCIUM SERPL-MCNC: 10.5 MG/DL (ref 7.8–10.44)
CHLORIDE SERPL-SCNC: 97 MMOL/L (ref 98–107)
CO2 SERPL-SCNC: 25 MMOL/L (ref 22–29)
CREAT CL PREDICTED SERPL C-G-VRATE: 180 ML/MIN (ref 70–130)
EOSINOPHIL # BLD AUTO: 0 THOU/UL (ref 0–0.7)
EOSINOPHIL NFR BLD AUTO: 0.3 % (ref 0–10)
GLUCOSE SERPL-MCNC: 308 MG/DL (ref 70–105)
HGB BLD-MCNC: 15.9 G/DL (ref 14–18)
LYMPHOCYTES # BLD: 1 THOU/UL (ref 1.2–3.4)
LYMPHOCYTES NFR BLD AUTO: 14.1 % (ref 21–51)
MCH RBC QN AUTO: 26.3 PG (ref 27–31)
MCV RBC AUTO: 75.9 FL (ref 78–98)
MONOCYTES # BLD AUTO: 0.4 THOU/UL (ref 0.11–0.59)
MONOCYTES NFR BLD AUTO: 5.7 % (ref 0–10)
NEUTROPHILS # BLD AUTO: 5.7 THOU/UL (ref 1.4–6.5)
NEUTROPHILS NFR BLD AUTO: 79.8 % (ref 42–75)
PLATELET # BLD AUTO: 161 THOU/UL (ref 130–400)
POTASSIUM SERPL-SCNC: 4.6 MMOL/L (ref 3.5–5.1)
RBC # BLD AUTO: 6.02 MILL/UL (ref 4.7–6.1)
SODIUM SERPL-SCNC: 132 MMOL/L (ref 136–145)
WBC # BLD AUTO: 7.2 THOU/UL (ref 4.8–10.8)

## 2021-11-11 RX ADMIN — INSULIN GLARGINE SCH UNIT: 100 INJECTION, SOLUTION SUBCUTANEOUS at 10:51

## 2021-11-11 RX ADMIN — INSULIN LISPRO PRN UNIT: 100 INJECTION, SOLUTION INTRAVENOUS; SUBCUTANEOUS at 06:00

## 2021-11-11 RX ADMIN — INSULIN LISPRO PRN UNIT: 100 INJECTION, SOLUTION INTRAVENOUS; SUBCUTANEOUS at 22:02

## 2021-11-11 RX ADMIN — INSULIN LISPRO PRN UNIT: 100 INJECTION, SOLUTION INTRAVENOUS; SUBCUTANEOUS at 17:09

## 2021-11-11 RX ADMIN — INSULIN LISPRO PRN UNIT: 100 INJECTION, SOLUTION INTRAVENOUS; SUBCUTANEOUS at 11:54

## 2021-11-11 RX ADMIN — ACETAMINOPHEN AND CODEINE PHOSPHATE SCH TAB: 300; 30 TABLET ORAL at 10:09

## 2021-11-11 RX ADMIN — ACETAMINOPHEN AND CODEINE PHOSPHATE SCH: 300; 30 TABLET ORAL at 15:46

## 2021-11-11 RX ADMIN — ACETAMINOPHEN AND CODEINE PHOSPHATE SCH TAB: 300; 30 TABLET ORAL at 15:48

## 2021-11-11 RX ADMIN — ACETAMINOPHEN AND CODEINE PHOSPHATE PRN TAB: 300; 30 TABLET ORAL at 20:10

## 2021-11-11 RX ADMIN — INSULIN GLARGINE SCH UNIT: 100 INJECTION, SOLUTION SUBCUTANEOUS at 20:11

## 2021-11-11 RX ADMIN — ACETAMINOPHEN AND CODEINE PHOSPHATE SCH TAB: 300; 30 TABLET ORAL at 23:50

## 2021-11-12 VITALS — TEMPERATURE: 98.3 F | DIASTOLIC BLOOD PRESSURE: 94 MMHG | SYSTOLIC BLOOD PRESSURE: 132 MMHG

## 2021-11-12 RX ADMIN — INSULIN GLARGINE SCH UNIT: 100 INJECTION, SOLUTION SUBCUTANEOUS at 08:16

## 2021-11-12 RX ADMIN — ACETAMINOPHEN AND CODEINE PHOSPHATE SCH TAB: 300; 30 TABLET ORAL at 11:51

## 2021-11-12 RX ADMIN — ACETAMINOPHEN AND CODEINE PHOSPHATE PRN TAB: 300; 30 TABLET ORAL at 03:08

## 2021-11-12 RX ADMIN — INSULIN LISPRO PRN UNIT: 100 INJECTION, SOLUTION INTRAVENOUS; SUBCUTANEOUS at 16:15

## 2021-11-12 RX ADMIN — INSULIN LISPRO PRN UNIT: 100 INJECTION, SOLUTION INTRAVENOUS; SUBCUTANEOUS at 08:11

## 2021-11-12 RX ADMIN — ACETAMINOPHEN AND CODEINE PHOSPHATE SCH TAB: 300; 30 TABLET ORAL at 06:23

## 2021-11-12 RX ADMIN — INSULIN LISPRO PRN UNIT: 100 INJECTION, SOLUTION INTRAVENOUS; SUBCUTANEOUS at 14:32

## 2021-11-12 RX ADMIN — INSULIN LISPRO PRN UNIT: 100 INJECTION, SOLUTION INTRAVENOUS; SUBCUTANEOUS at 06:24
